# Patient Record
Sex: FEMALE | Race: WHITE | Employment: FULL TIME | URBAN - METROPOLITAN AREA
[De-identification: names, ages, dates, MRNs, and addresses within clinical notes are randomized per-mention and may not be internally consistent; named-entity substitution may affect disease eponyms.]

---

## 2018-10-24 ENCOUNTER — HOSPITAL ENCOUNTER (OUTPATIENT)
Facility: CLINIC | Age: 48
Setting detail: OBSERVATION
Discharge: HOME | End: 2018-10-26
Attending: EMERGENCY MEDICINE | Admitting: ORTHOPAEDIC SURGERY
Payer: OTHER MISCELLANEOUS

## 2018-10-24 ENCOUNTER — APPOINTMENT (OUTPATIENT)
Dept: GENERAL RADIOLOGY | Facility: CLINIC | Age: 48
End: 2018-10-24
Attending: EMERGENCY MEDICINE
Payer: OTHER MISCELLANEOUS

## 2018-10-24 DIAGNOSIS — S42.292A HUMERUS HEAD FRACTURE, LEFT, CLOSED, INITIAL ENCOUNTER: ICD-10-CM

## 2018-10-24 PROBLEM — S42.302A LEFT HUMERAL FRACTURE: Status: ACTIVE | Noted: 2018-10-24

## 2018-10-24 LAB
ANION GAP SERPL CALCULATED.3IONS-SCNC: 8 MMOL/L (ref 3–14)
BASOPHILS # BLD AUTO: 0.1 10E9/L (ref 0–0.2)
BASOPHILS NFR BLD AUTO: 0.5 %
BUN SERPL-MCNC: 17 MG/DL (ref 7–30)
CALCIUM SERPL-MCNC: 9.4 MG/DL (ref 8.5–10.1)
CHLORIDE SERPL-SCNC: 107 MMOL/L (ref 94–109)
CO2 SERPL-SCNC: 26 MMOL/L (ref 20–32)
CREAT SERPL-MCNC: 0.69 MG/DL (ref 0.52–1.04)
DIFFERENTIAL METHOD BLD: ABNORMAL
EOSINOPHIL # BLD AUTO: 0.2 10E9/L (ref 0–0.7)
EOSINOPHIL NFR BLD AUTO: 1.6 %
ERYTHROCYTE [DISTWIDTH] IN BLOOD BY AUTOMATED COUNT: 13.8 % (ref 10–15)
GFR SERPL CREATININE-BSD FRML MDRD: >90 ML/MIN/1.7M2
GLUCOSE BLDC GLUCOMTR-MCNC: 160 MG/DL (ref 70–99)
GLUCOSE SERPL-MCNC: 154 MG/DL (ref 70–99)
HBA1C MFR BLD: 7.3 % (ref 0–5.6)
HCT VFR BLD AUTO: 45 % (ref 35–47)
HGB BLD-MCNC: 15.2 G/DL (ref 11.7–15.7)
IMM GRANULOCYTES # BLD: 0.1 10E9/L (ref 0–0.4)
IMM GRANULOCYTES NFR BLD: 0.9 %
LYMPHOCYTES # BLD AUTO: 1.8 10E9/L (ref 0.8–5.3)
LYMPHOCYTES NFR BLD AUTO: 12.3 %
MCH RBC QN AUTO: 28.8 PG (ref 26.5–33)
MCHC RBC AUTO-ENTMCNC: 33.8 G/DL (ref 31.5–36.5)
MCV RBC AUTO: 85 FL (ref 78–100)
MONOCYTES # BLD AUTO: 0.8 10E9/L (ref 0–1.3)
MONOCYTES NFR BLD AUTO: 5.3 %
NEUTROPHILS # BLD AUTO: 11.8 10E9/L (ref 1.6–8.3)
NEUTROPHILS NFR BLD AUTO: 79.4 %
NRBC # BLD AUTO: 0 10*3/UL
NRBC BLD AUTO-RTO: 0 /100
PLATELET # BLD AUTO: 347 10E9/L (ref 150–450)
POTASSIUM SERPL-SCNC: 4.2 MMOL/L (ref 3.4–5.3)
RBC # BLD AUTO: 5.28 10E12/L (ref 3.8–5.2)
SODIUM SERPL-SCNC: 141 MMOL/L (ref 133–144)
WBC # BLD AUTO: 14.8 10E9/L (ref 4–11)

## 2018-10-24 PROCEDURE — 73060 X-RAY EXAM OF HUMERUS: CPT | Mod: LT

## 2018-10-24 PROCEDURE — 96372 THER/PROPH/DIAG INJ SC/IM: CPT | Mod: 59

## 2018-10-24 PROCEDURE — 25000128 H RX IP 250 OP 636: Performed by: PHYSICIAN ASSISTANT

## 2018-10-24 PROCEDURE — 99207 ZZC CONSULT E&M CHANGED TO SUBSEQUENT LEVEL: CPT | Performed by: INTERNAL MEDICINE

## 2018-10-24 PROCEDURE — 99225 ZZC SUBSEQUENT OBSERVATION CARE,LEVEL II: CPT | Performed by: INTERNAL MEDICINE

## 2018-10-24 PROCEDURE — 80048 BASIC METABOLIC PNL TOTAL CA: CPT | Performed by: EMERGENCY MEDICINE

## 2018-10-24 PROCEDURE — 00000146 ZZHCL STATISTIC GLUCOSE BY METER IP

## 2018-10-24 PROCEDURE — 25000132 ZZH RX MED GY IP 250 OP 250 PS 637: Performed by: HOSPITALIST

## 2018-10-24 PROCEDURE — 25000128 H RX IP 250 OP 636: Performed by: EMERGENCY MEDICINE

## 2018-10-24 PROCEDURE — 99285 EMERGENCY DEPT VISIT HI MDM: CPT | Mod: 25

## 2018-10-24 PROCEDURE — 25000132 ZZH RX MED GY IP 250 OP 250 PS 637: Performed by: EMERGENCY MEDICINE

## 2018-10-24 PROCEDURE — 85025 COMPLETE CBC W/AUTO DIFF WBC: CPT | Performed by: EMERGENCY MEDICINE

## 2018-10-24 PROCEDURE — 96374 THER/PROPH/DIAG INJ IV PUSH: CPT

## 2018-10-24 PROCEDURE — 25000128 H RX IP 250 OP 636

## 2018-10-24 PROCEDURE — 83036 HEMOGLOBIN GLYCOSYLATED A1C: CPT | Performed by: EMERGENCY MEDICINE

## 2018-10-24 PROCEDURE — 29105 APPLICATION LONG ARM SPLINT: CPT | Mod: LT

## 2018-10-24 PROCEDURE — 96376 TX/PRO/DX INJ SAME DRUG ADON: CPT

## 2018-10-24 PROCEDURE — 25000131 ZZH RX MED GY IP 250 OP 636 PS 637: Performed by: INTERNAL MEDICINE

## 2018-10-24 PROCEDURE — G0378 HOSPITAL OBSERVATION PER HR: HCPCS

## 2018-10-24 PROCEDURE — 96361 HYDRATE IV INFUSION ADD-ON: CPT

## 2018-10-24 RX ORDER — HYDROMORPHONE HYDROCHLORIDE 1 MG/ML
.3-.5 INJECTION, SOLUTION INTRAMUSCULAR; INTRAVENOUS; SUBCUTANEOUS
Status: DISCONTINUED | OUTPATIENT
Start: 2018-10-24 | End: 2018-10-25

## 2018-10-24 RX ORDER — ACETAMINOPHEN 325 MG/1
650 TABLET ORAL EVERY 4 HOURS PRN
Status: DISCONTINUED | OUTPATIENT
Start: 2018-10-24 | End: 2018-10-25

## 2018-10-24 RX ORDER — NALOXONE HYDROCHLORIDE 0.4 MG/ML
.1-.4 INJECTION, SOLUTION INTRAMUSCULAR; INTRAVENOUS; SUBCUTANEOUS
Status: DISCONTINUED | OUTPATIENT
Start: 2018-10-24 | End: 2018-10-25

## 2018-10-24 RX ORDER — OXYCODONE HYDROCHLORIDE 5 MG/1
5-10 TABLET ORAL
Status: DISCONTINUED | OUTPATIENT
Start: 2018-10-24 | End: 2018-10-24

## 2018-10-24 RX ORDER — INSULIN GLARGINE 100 [IU]/ML
80 INJECTION, SOLUTION SUBCUTANEOUS AT BEDTIME
COMMUNITY

## 2018-10-24 RX ORDER — HYDROMORPHONE HYDROCHLORIDE 1 MG/ML
INJECTION, SOLUTION INTRAMUSCULAR; INTRAVENOUS; SUBCUTANEOUS
Status: COMPLETED
Start: 2018-10-24 | End: 2018-10-24

## 2018-10-24 RX ORDER — CETIRIZINE HYDROCHLORIDE 10 MG/1
10 TABLET ORAL DAILY
COMMUNITY

## 2018-10-24 RX ORDER — DEXTROSE MONOHYDRATE 25 G/50ML
25-50 INJECTION, SOLUTION INTRAVENOUS
Status: DISCONTINUED | OUTPATIENT
Start: 2018-10-24 | End: 2018-10-26

## 2018-10-24 RX ORDER — ACETAMINOPHEN 650 MG/1
650 SUPPOSITORY RECTAL EVERY 4 HOURS PRN
Status: DISCONTINUED | OUTPATIENT
Start: 2018-10-24 | End: 2018-10-25

## 2018-10-24 RX ORDER — HYDROMORPHONE HYDROCHLORIDE 2 MG/1
2 TABLET ORAL
Status: DISCONTINUED | OUTPATIENT
Start: 2018-10-24 | End: 2018-10-25

## 2018-10-24 RX ORDER — NICOTINE POLACRILEX 4 MG
15-30 LOZENGE BUCCAL
Status: DISCONTINUED | OUTPATIENT
Start: 2018-10-24 | End: 2018-10-26

## 2018-10-24 RX ORDER — CYCLOBENZAPRINE HCL 5 MG
5 TABLET ORAL 3 TIMES DAILY PRN
Status: DISCONTINUED | OUTPATIENT
Start: 2018-10-24 | End: 2018-10-25

## 2018-10-24 RX ORDER — LEVOTHYROXINE SODIUM 175 UG/1
TABLET ORAL DAILY
COMMUNITY

## 2018-10-24 RX ORDER — LEVOTHYROXINE SODIUM 175 UG/1
350 TABLET ORAL DAILY
COMMUNITY
End: 2018-10-24

## 2018-10-24 RX ORDER — SODIUM CHLORIDE 9 MG/ML
1000 INJECTION, SOLUTION INTRAVENOUS CONTINUOUS
Status: DISCONTINUED | OUTPATIENT
Start: 2018-10-24 | End: 2018-10-24

## 2018-10-24 RX ORDER — ONDANSETRON 2 MG/ML
4 INJECTION INTRAMUSCULAR; INTRAVENOUS ONCE
Status: DISCONTINUED | OUTPATIENT
Start: 2018-10-24 | End: 2018-10-24

## 2018-10-24 RX ORDER — IBUPROFEN 600 MG/1
600 TABLET, FILM COATED ORAL ONCE
Status: COMPLETED | OUTPATIENT
Start: 2018-10-24 | End: 2018-10-24

## 2018-10-24 RX ADMIN — HYDROMORPHONE HYDROCHLORIDE 1 MG: 1 INJECTION, SOLUTION INTRAMUSCULAR; INTRAVENOUS; SUBCUTANEOUS at 16:03

## 2018-10-24 RX ADMIN — SODIUM CHLORIDE 1000 ML: 9 INJECTION, SOLUTION INTRAVENOUS at 16:17

## 2018-10-24 RX ADMIN — HYDROMORPHONE HYDROCHLORIDE 1 MG: 1 INJECTION, SOLUTION INTRAMUSCULAR; INTRAVENOUS; SUBCUTANEOUS at 21:06

## 2018-10-24 RX ADMIN — HYDROMORPHONE HYDROCHLORIDE 2 MG: 2 TABLET ORAL at 23:32

## 2018-10-24 RX ADMIN — INSULIN GLARGINE 60 UNITS: 100 INJECTION, SOLUTION SUBCUTANEOUS at 23:32

## 2018-10-24 RX ADMIN — HYDROMORPHONE HYDROCHLORIDE 1 MG: 1 INJECTION, SOLUTION INTRAMUSCULAR; INTRAVENOUS; SUBCUTANEOUS at 16:35

## 2018-10-24 RX ADMIN — Medication 1 MG: at 16:35

## 2018-10-24 RX ADMIN — IBUPROFEN 600 MG: 600 TABLET, FILM COATED ORAL at 14:26

## 2018-10-24 RX ADMIN — Medication 0.5 MG: at 18:51

## 2018-10-24 RX ADMIN — CYCLOBENZAPRINE HYDROCHLORIDE 5 MG: 5 TABLET, FILM COATED ORAL at 23:31

## 2018-10-24 ASSESSMENT — ENCOUNTER SYMPTOMS
WOUND: 1
HEADACHES: 0
NUMBNESS: 1

## 2018-10-24 NOTE — ED NOTES
"[facility]  ED Nurse Handoff Report    ED Chief complaint: Shoulder Pain (tripped on lip of floor and fell hitting L shoulder against door jam. )      ED Diagnosis:   Final diagnoses:   Humerus head fracture, left, closed, initial encounter       Code Status: Full Code    Allergies:   Allergies   Allergen Reactions     Contrast Dye        Activity level - Baseline/Home:  Independent    Activity Level - Current:   Stand with Assist     Needed?: No    Isolation: No  Infection: Not Applicable  Bariatric?: Yes    Vital Signs:   Vitals:    10/24/18 1410 10/24/18 1411 10/24/18 1415 10/24/18 1604   BP: (!) 161/107      Pulse: 93      Resp: 18      Temp:  98.2  F (36.8  C)     TempSrc:  Oral     SpO2: 95%  93% 93%   Weight: 138.3 kg (305 lb)      Height: 1.575 m (5' 2\")          Cardiac Rhythm: ,        Pain level: 0-10 Pain Scale: 10    Is this patient confused?: No   [place] - Suicide Severity Rating Scale Completed?  Yes  If yes, what color did the patient score?  White    Patient Report: Initial Complaint: Patient presents to ED via EMS from work after tripping on a small piece of don and jamming L shoulder into door jam. Patient has L humerus pain.   Focused Assessment: L humerus pain, some numbness to L hand.   Tests Performed: Xrays and pain control.   Abnormal Results: L humerus fracture  Treatments provided: Pain mgt.    Family Comments: Boss is here, patient is from out of town.     OBS brochure/video discussed/provided to patient/family: N/A              Name of person given brochure if not patient:               Relationship to patient:     ED Medications:   Medications   sodium chloride (PF) 0.9% PF flush 3 mL (not administered)   sodium chloride (PF) 0.9% PF flush 3 mL (not administered)   0.9% sodium chloride BOLUS (not administered)     Followed by   sodium chloride 0.9% infusion (not administered)   ondansetron (ZOFRAN) injection 4 mg (not administered)   ibuprofen " (ADVIL/MOTRIN) tablet 600 mg (600 mg Oral Given 10/24/18 8760)   HYDROmorphone (DILAUDID) injection 1 mg (1 mg Intravenous Given 10/24/18 9712)       Drips infusing?:  Yes    For the majority of the shift this patient was Green.   Interventions performed were none.    Severe Sepsis OR Septic Shock Diagnosis Present: No    To be done/followed up on inpatient unit:      ED NURSE PHONE NUMBER: [phone]

## 2018-10-24 NOTE — IP AVS SNAPSHOT
MRN:[id number]                      After Visit Summary   10/24/2018    Moon Mg    MRN: [id number]           Thank you!     Thank you for choosing [facility] for your care. Our goal is always to provide you with excellent care. Hearing back from our patients is one way we can continue to improve our services. Please take a few minutes to complete the written survey that you may receive in the mail after you visit with us. Thank you!        Patient Information     Date Of Birth          1970        Designated Caregiver       Most Recent Value    Caregiver    Will someone help with your care after discharge? yes    Name of designated caregiver Brother      About your hospital stay     You were admitted on:  October 24, 2018 You last received care in the:  [place] [address] [facility]    You were discharged on:  October 26, 2018        Reason for your hospital stay       Left humerus fracture with open reduction and internal fixation                  Who to Call     For medical emergencies, please call 911.  For non-urgent questions about your medical care, please call your primary care provider or clinic, None  For questions related to your surgery, please call your surgery clinic        Attending Provider     Provider Specialty    Trierweiler, Chad A, MD Emergency Medicine    Pietro Boone MD Orthopaedic Surgery       Primary Care Provider    Provider Not In System      After Care Instructions     Activity       Your activity upon discharge: activity as tolerated, no driving while on analgesics and no weight bearing through the left UE, may do codmans and pendulums when pain controlled            Diet       Follow this diet upon discharge: Orders Placed This Encounter      Moderate Consistent CHO Diet            Patient care order       Go up slowly on your lantus.   Check blood sugars 4 times daily for next 3-4 days then as you normally would if back to your baseline  "appetite and food intake.   If blood sugars in the 100 range today then stay at Lantus 45 units tonight, If blood sugars in 200 range than increase to 55 units at bedtime. If blood sugars in the 300 range increase to ~65 units tonight at bedtime. If blood sugars greater than 400 then increase back to 75-80 units of lantus tonight. You can use this scale for then next several days.  Stay hydrated with water to prevent dehydration if blood sugars are elevated.            Wound care and dressings       Instructions to care for your wound at home: ice to area for comfort, keep wound clean and dry and reinforce dressing as needed.  Leave dressing intact                  Follow-up Appointments     Follow-up and recommended labs and tests        Follow up with Shoulder specialist or fracture specialist in [place], within 10 days  to evaluate after surgery. No follow up labs or test are needed.                  Pending Results     Date and Time Order Name Status Description    10/24/2018 2205 EKG 12-lead, tracing only Preliminary             Admission Information     Date & Time Provider Department Dept. Phone    10/24/2018 Pietro Boone MD [place] [address] [facility] [phone]      Your Vitals Were     Blood Pressure Pulse Temperature Respirations Height Weight    127/74 (BP Location: Right arm) 72 98.7  F (37.1  C) (Oral) 16 1.575 m (5' 2\") 138.3 kg (305 lb)    Pulse Oximetry BMI (Body Mass Index)                92% 55.79 kg/m2          [employer] Information     The [employer] lets you send messages to your doctor, view your test results, renew your prescriptions, schedule appointments and more. To sign up, go to [email or web address] . Click on \"Log in\" on the left side of the screen, which will take you to the Welcome page. Then click on \"Sign up Now\" on the right side of the page.     You will be asked to enter the access code listed below, as well as some personal information. Please follow the " directions to create your username and password.     Your access code is: [email or web address]  Expires: 2019 11:44 AM     Your access code will  in 90 days. If you need help or a new code, please call your [facility] clinic or [phone].        Care EveryWhere ID     This is your Care EveryWhere ID. This could be used by other organizations to access your [facility] medical records  [id number]        Equal Access to Services     GUSTAVO SANTACRUZ : Hadii aad ku hadasho Soomaali, waaxda luqadaha, qaybta kaalmada adeegyada, waxay elyssain hayaan adeolivia bradleydeejaypedro pablo lack . So [facility] [phone].    ATENCIÓN: Si habla español, tiene a casillas disposición servicios gratuitos de asistencia lingüística. Llame al [phone].    We comply with applicable federal civil rights laws and [place] laws. We do not discriminate on the basis of race, color, national origin, age, disability, sex, sexual orientation, or gender identity.               Review of your medicines      START taking        Dose / Directions    acetaminophen 325 MG tablet   Commonly known as:  TYLENOL   Used for:  Humerus head fracture, left, closed, initial encounter        Dose:  650 mg   Start taking on:  10/28/2018   Take 2 tablets (650 mg) by mouth every 4 hours as needed for pain   Quantity:  40 tablet   Refills:  0       aspirin 325 MG EC tablet   Used for:  Humerus head fracture, left, closed, initial encounter        Dose:  325 mg   Take 1 tablet (325 mg) by mouth daily   Quantity:  30 tablet   Refills:  0       cyclobenzaprine 5 MG tablet   Commonly known as:  FLEXERIL   Used for:  Humerus head fracture, left, closed, initial encounter        Dose:  5 mg   Take 1 tablet (5 mg) by mouth 3 times daily as needed for muscle spasms   Quantity:  30 tablet   Refills:  0       HYDROmorphone 2 MG tablet   Commonly known as:  DILAUDID   Used for:  Humerus head fracture, left, closed, initial encounter        Dose:  2-4 mg   Take 1-2 tablets (2-4 mg) by mouth every 4  hours as needed for moderate to severe pain   Quantity:  30 tablet   Refills:  0       hydrOXYzine 25 MG tablet   Commonly known as:  ATARAX   Used for:  Humerus head fracture, left, closed, initial encounter        Dose:  25 mg   Take 1 tablet (25 mg) by mouth every 6 hours as needed for itching   Quantity:  30 tablet   Refills:  0       ondansetron 4 MG ODT tab   Commonly known as:  ZOFRAN-ODT   Used for:  Humerus head fracture, left, closed, initial encounter        Dose:  4 mg   Take 1 tablet (4 mg) by mouth every 6 hours as needed for nausea or vomiting   Quantity:  10 tablet   Refills:  0       senna-docusate 8.6-50 MG per tablet   Commonly known as:  SENOKOT-S;PERICOLACE   Used for:  Humerus head fracture, left, closed, initial encounter        Dose:  2 tablet   Take 2 tablets by mouth 2 times daily   Quantity:  40 tablet   Refills:  0         CONTINUE these medicines which have NOT CHANGED        Dose / Directions    cetirizine 10 MG tablet   Commonly known as:  zyrTEC        Dose:  10 mg   Take 10 mg by mouth daily   Refills:  0       insulin glargine 100 UNIT/ML injection   Commonly known as:  LANTUS        Dose:  80 Units   Inject 80 Units Subcutaneous At Bedtime   Refills:  0       insulin lispro 100 UNIT/ML injection   Commonly known as:  HumaLOG PEN        Inject Subcutaneous 3 times daily (before meals) Per sliding scale. Pt not sure about the dosing. Most of the times she does not use.   Refills:  0       levothyroxine 175 MCG tablet   Commonly known as:  SYNTHROID/LEVOTHROID        Take by mouth daily 1 tablet (175 mcg daily) except 2 tablets (350 mcg) on Wednesdays.   Refills:  0            Where to get your medicines      These medications were sent to [facility] - [address]  [address] [place] [address]     Phone:  [phone]     ondansetron 4 MG ODT tab         Some of these will need a paper prescription and others can be bought over the  counter. Ask your nurse if you have questions.     Bring a paper prescription for each of these medications     acetaminophen 325 MG tablet    aspirin 325 MG EC tablet    cyclobenzaprine 5 MG tablet    HYDROmorphone 2 MG tablet    hydrOXYzine 25 MG tablet    senna-docusate 8.6-50 MG per tablet                Protect others around you: Learn how to safely use, store and throw away your medicines at www.disposemymeds.org.        Information about OPIOIDS     PRESCRIPTION OPIOIDS: WHAT YOU NEED TO KNOW   We gave you an opioid (narcotic) pain medicine. It is important to manage your pain, but opioids are not always the best choice. You should first try all the other options your care team gave you. Take this medicine for as short a time (and as few doses) as possible.    Some activities can increase your pain, such as bandage changes or therapy sessions. It may help to take your pain medicine 30 to 60 minutes before these activities. Reduce your stress by getting enough sleep, working on hobbies you enjoy and practicing relaxation or meditation. Talk to your care team about ways to manage your pain beyond prescription opioids.    These medicines have risks:    DO NOT drive when on new or higher doses of pain medicine. These medicines can affect your alertness and reaction times, and you could be arrested for driving under the influence (DUI). If you need to use opioids long-term, talk to your care team about driving.    DO NOT operate heavy machinery    DO NOT do any other dangerous activities while taking these medicines.    DO NOT drink any alcohol while taking these medicines.     If the opioid prescribed includes acetaminophen, DO NOT take with any other medicines that contain acetaminophen. Read all labels carefully. Look for the word  acetaminophen  or  Tylenol.  Ask your pharmacist if you have questions or are unsure.    You can get addicted to pain medicines, especially if you have a history of addiction  (chemical, alcohol or substance dependence). Talk to your care team about ways to reduce this risk.    All opioids tend to cause constipation. Drink plenty of water and eat foods that have a lot of fiber, such as fruits, vegetables, prune juice, apple juice and high-fiber cereal. Take a laxative (Miralax, milk of magnesia, Colace, Senna) if you don t move your bowels at least every other day. Other side effects include upset stomach, sleepiness, dizziness, throwing up, tolerance (needing more of the medicine to have the same effect), physical dependence and slowed breathing.    Store your pills in a secure place, locked if possible. We will not replace any lost or stolen medicine. If you don t finish your medicine, please throw away (dispose) as directed by your pharmacist. The [place] Pollution [employer] has more information about safe disposal: https://[email or web address]/living-green/managing-unwanted-medications             Medication List: This is a list of all your medications and when to take them. Check marks below indicate your daily home schedule. Keep this list as a reference.      Medications           Morning Afternoon Evening Bedtime As Needed    acetaminophen 325 MG tablet   Commonly known as:  TYLENOL   Take 2 tablets (650 mg) by mouth every 4 hours as needed for pain   Start taking on:  10/28/2018   Last time this was given:  975 mg on 10/26/2018  9:37 AM   Last time this was given:  10/26 12:37                                   aspirin 325 MG EC tablet   Take 1 tablet (325 mg) by mouth daily   Last time this was given:  325 mg on 10/26/2018  9:39 AM   Next Dose Due:  10/27 am                                   cetirizine 10 MG tablet   Commonly known as:  zyrTEC   Take 10 mg by mouth daily   Next Dose Due:  Resume as directed                                cyclobenzaprine 5 MG tablet   Commonly known as:  FLEXERIL   Take 1 tablet (5 mg) by mouth 3 times daily as needed for muscle spasms    Last time this was given:  5 mg on 10/25/2018  8:25 PM   Next Dose Due:  Anytime                                HYDROmorphone 2 MG tablet   Commonly known as:  DILAUDID   Take 1-2 tablets (2-4 mg) by mouth every 4 hours as needed for moderate to severe pain   Last time this was given:  2 mg on 10/26/2018  9:41 AM   Next Dose Due:  10/26 1:41                                   hydrOXYzine 25 MG tablet   Commonly known as:  ATARAX   Take 1 tablet (25 mg) by mouth every 6 hours as needed for itching   Next Dose Due:  Anytime **Be careful may make you drwosy                                insulin glargine 100 UNIT/ML injection   Commonly known as:  LANTUS   Inject 80 Units Subcutaneous At Bedtime   Last time this was given:  45 Units on 10/25/2018 10:55 PM   Next Dose Due:  10/26 pm                                   insulin lispro 100 UNIT/ML injection   Commonly known as:  HumaLOG PEN   Inject Subcutaneous 3 times daily (before meals) Per sliding scale. Pt not sure about the dosing. Most of the times she does not use.   Next Dose Due:  Resume as directed                                levothyroxine 175 MCG tablet   Commonly known as:  SYNTHROID/LEVOTHROID   Take by mouth daily 1 tablet (175 mcg daily) except 2 tablets (350 mcg) on Wednesdays.   Last time this was given:  175 mcg on 10/26/2018  9:38 AM                                ondansetron 4 MG ODT tab   Commonly known as:  ZOFRAN-ODT   Take 1 tablet (4 mg) by mouth every 6 hours as needed for nausea or vomiting   Next Dose Due:  Take when nauseated                                senna-docusate 8.6-50 MG per tablet   Commonly known as:  SENOKOT-S;PERICOLACE   Take 2 tablets by mouth 2 times daily   Last time this was given:  1 tablet on 10/26/2018  9:39 AM   Next Dose Due:  10/26 pm

## 2018-10-24 NOTE — CONSULTS
[facility]    Hospitalist Consultation    Date of Admission:  10/24/2018    Assessment & Plan   Moon Mg is a 48 year old female with a history of DM type II and hypothyroidism who was admitted on 10/24/2018 for a left humerus fracture.  I was asked to see the patient for medical co-management and pre-op evaluation     Left humerus fracture  Fell against a door frame fracturing her left humerus.  Imaging showed an oblique displaced fracture through the shaft of the humerus.  Patient was admitted by orthopedic surgery for further pain control and possible surgical intervention   - Will defer pain control and briseyda-operative management to orthopedic surgery if they decide to operate   - I believe the patient is moderate risk for a moderate risk surgery if they decide to operate.  Patient is morbidly obese so it is difficulty to assess her metabolic activity but assume it is >4.  She does get short of breath with significant exertion but no chest pain.  Will obtain EKG in mean time     DM type II   Patient isn't too forth coming with what her home sugars have been recently but does note they were high a couple weeks ago because she was on Prednisone but is no longer on that.  No HgbA1c on file.  PTA on Lantus 80 U at bedtime and SSI   - HgbA1c ordered  - Will decreased Lantus to 60 U and adjust as needed   - Medium intensity SSI  - Moderate CHO diet     Hypothyroidism   No TSH on file  - PTA Synthroid 175 mcg     Morbid obesity  - Patient to follow up with PCP     DVT Prophylaxis: Defer to primary service  Code Status: Full code    Disposition: Expected discharge per primary service     Luke Zuluaga DO    Reason for Consult   Reason for consult: pre-op evaluation and medical co-management     Primary Care Physician   Provider Not In System- lives in [place]     Chief Complaint   Left shoulder pain     History is obtained from the patient    History of Present Illness   Moon Mg  is a 48 year old female with a history of DM type II and hypothyroidism who presented to the ED with left shoulder pain after a fall.  She reported that she tripped on a raised floor and fell forward striking her left shoulder against the doorframe.  She immediately had pain to the left shoulder.  She did not hit her head or lose consciousness.  She denies any recent illnesses, fevers, chills, cough, chest pain, SOB, abdominal pain, N/V/D or problems with urination.      Of note, the patient was initially scheduled to fly home to [place] today.     Past Medical History    DM type II   Hypothyroidism    Past Surgical History   I have reviewed this patient's surgical history and updated it with pertinent information if needed.  Past Surgical History:   Procedure Laterality Date     BACK SURGERY       GYN SURGERY         Prior to Admission Medications   Prior to Admission Medications   Prescriptions Last Dose Informant Patient Reported? Taking?   insulin glargine (LANTUS) 100 UNIT/ML injection 10/23/2018 at Unknown time Self Yes Yes   Sig: Inject 80 Units Subcutaneous At Bedtime    insulin lispro (HUMALOG PEN) 100 UNIT/ML injection  Self Yes Yes   Sig: Inject Subcutaneous 3 times daily (before meals) Per sliding scale. Pt not sure about the dosing.   levothyroxine (SYNTHROID/LEVOTHROID) 175 MCG tablet 10/24/2018 at Unknown time Self Yes Yes   Sig: Take 175 mcg by mouth daily 1 tablet (175 mcg daily) except 2 tablets (350 mcg) on Wednesdays.      Facility-Administered Medications: None     Allergies   Allergies   Allergen Reactions     Contrast Dye        Social History   I have reviewed this patient's social history and updated it with pertinent information if needed. Moon Mg  reports that she has never smoked. She does not have any smokeless tobacco history on file. She reports that she drinks alcohol. She reports that she does not use illicit drugs.    Family History   I have reviewed this patient's family  history and updated it with pertinent information if needed.   Diabetes    Review of Systems   The 10 point Review of Systems is negative other than noted in the HPI     Physical Exam   Temp: 98.2  F (36.8  C) Temp src: Oral BP: (!) 161/107 Pulse: 93   Resp: 18 SpO2: 93 % O2 Device: None (Room air)    Vital Signs with Ranges  Temp:  [98.2  F (36.8  C)] 98.2  F (36.8  C)  Pulse:  [93] 93  Resp:  [18] 18  BP: (161)/(107) 161/107  SpO2:  [93 %-95 %] 93 %  305 lbs 0 oz    onstitutional: Awake, alert, cooperative, no apparent distress.  Eyes: Conjunctiva and pupils examined and normal.  HEENT: Moist mucous membranes, normal dentition.  Respiratory: Clear to auscultation bilaterally, no crackles or wheezing.  Cardiovascular: Regular rate and rhythm, normal S1 and S2, and no murmur noted.  GI: Soft, non-distended, non-tender, normal bowel sounds.  Lymph/Hematologic: No anterior cervical or supraclavicular adenopathy.  Skin: No rashes, no cyanosis, no edema.  Musculoskeletal: Left arm is in a sling.   Still able to move left fingers   Neurologic: Cranial nerves 2-12 intact, normal strength and sensation.  Psychiatric: Alert, oriented to person, place and time, no obvious anxiety or depression.    Data   -Data reviewed today: All pertinent laboratory and imaging results from this encounter were reviewed. I personally reviewed  X-ray left humerus:  Fracture through the shaft of the humerus      Recent Labs  Lab 10/24/18  1613   WBC 14.8*   HGB 15.2   MCV 85         POTASSIUM 4.2   CHLORIDE 107   CO2 26   BUN 17   CR 0.69   ANIONGAP 8   ANDREINA 9.4   *       Imaging:  Recent Results (from the past 24 hour(s))   XR Humerus Left G/E 2 Views    Narrative    HUMERUS LEFT TWO OR MORE VIEWS October 24, 2018 2:56 PM     HISTORY: Pain after trauma.    COMPARISON: None.      Impression    IMPRESSION: There is an oblique displaced fracture through the shaft  of the humerus between the proximal one third and the distal  two  thirds. The distal fracture fragment is displaced at least 2 cm  laterally and there is shortening of the humerus. Glenohumeral  articulation maintained.    CORNELIUS MASCORRO MD

## 2018-10-24 NOTE — ED NOTES
Bed: ED12  Expected date:   Expected time:   Means of arrival:   Comments:  vangie - 48F fall shoulder pain eta 1400

## 2018-10-24 NOTE — CONSULTS
[facility] Orthopedic Consultation    Moon Mg MRN# [id number]   Age: 48 year old YOB: 1970     Date of Admission:  10/24/2018    Reason for consult: Left proximal humerus fracture       Requesting physician: Dr. Treiweiler       Level of consult: Consult, follow and place orders           Assessment and Plan:   Assessment:   Left proximal humerus fracture      Plan:   Admit to observation for pain control  Sling  Pain medication as needed  Non-WB through left UE  Non-operative treatment at this time, will discuss again in the AM  NPO after midnight            Chief Complaint:   Left proximal humerus fracture         History of Present Illness:   This patient is a 48 year old female who presents with the following condition requiring a hospital admission:    Patient currently visiting from [place] for work. She was walking through a door and tripped on a raised part of the floor, resulting in her hitting her left shoulder on the door jam. She had immediate pain and presented to the ED for further workup and treatment planning.          Past Medical History:     Past Medical History:   Diagnosis Date     Diabetes (H)      Thyroid disease              Past Surgical History:     Past Surgical History:   Procedure Laterality Date     BACK SURGERY       GYN SURGERY               Social History:     Social History   Substance Use Topics     Smoking status: Never Smoker     Smokeless tobacco: Not on file     Alcohol use Yes      Comment: socially             Family History:   No family history on file.          Immunizations:     VACCINE/DOSE   Diptheria   DPT   DTAP   HBIG   Hepatitis A   Hepatitis B   HIB   Influenza   Measles   Meningococcal   MMR   Mumps   Pneumococcal   Polio   Rubella   Small Pox   TDAP   Varicella   Zoster             Allergies:     Allergies   Allergen Reactions     Contrast Dye              Medications:     Current Facility-Administered Medications   Medication  "    0.9% sodium chloride BOLUS    Followed by     sodium chloride 0.9% infusion     ondansetron (ZOFRAN) injection 4 mg     sodium chloride (PF) 0.9% PF flush 3 mL     sodium chloride (PF) 0.9% PF flush 3 mL     Current Outpatient Prescriptions   Medication Sig     insulin glargine (LANTUS) 100 UNIT/ML injection Inject Subcutaneous At Bedtime     Levothyroxine Sodium (SYNTHROID PO)              Review of Systems:   CV: NEGATIVE for chest pain, palpitations or peripheral edema  C: NEGATIVE for fever, chills, change in weight  E/M: NEGATIVE for ear, mouth and throat problems  R: NEGATIVE for significant cough or SOB          Physical Exam:   All vitals have been reviewed  Patient Vitals for the past 24 hrs:   BP Temp Temp src Pulse Resp SpO2 Height Weight   10/24/18 1604 - - - - - 93 % - -   10/24/18 1415 - - - - - 93 % - -   10/24/18 1411 - 98.2  F (36.8  C) Oral - - - - -   10/24/18 1410 (!) 161/107 - - 93 18 95 % 1.575 m (5' 2\") 138.3 kg (305 lb)     No intake or output data in the 24 hours ending 10/24/18 1654    Splint in place   Sensation intact in upper arm over biceps as well as in hand r/m/u nerve distribution  Able to abduct and oppose left thumb  +Radial pulse  +cap refill          Data:   All laboratory data reviewed  Results for orders placed or performed during the hospital encounter of 10/24/18   XR Humerus Left G/E 2 Views    Narrative    HUMERUS LEFT TWO OR MORE VIEWS October 24, 2018 2:56 PM     HISTORY: Pain after trauma.    COMPARISON: None.      Impression    IMPRESSION: There is an oblique displaced fracture through the shaft  of the humerus between the proximal one third and the distal two  thirds. The distal fracture fragment is displaced at least 2 cm  laterally and there is shortening of the humerus. Glenohumeral  articulation maintained.    CORNELIUS MASCORRO MD   CBC with platelets differential   Result Value Ref Range    WBC 14.8 (H) 4.0 - 11.0 10e9/L    RBC Count 5.28 (H) 3.8 - 5.2 10e12/L "    Hemoglobin 15.2 11.7 - 15.7 g/dL    Hematocrit 45.0 35.0 - 47.0 %    MCV 85 78 - 100 fl    MCH 28.8 26.5 - 33.0 pg    MCHC 33.8 31.5 - 36.5 g/dL    RDW 13.8 10.0 - 15.0 %    Platelet Count 347 150 - 450 10e9/L    Diff Method Automated Method     % Neutrophils 79.4 %    % Lymphocytes 12.3 %    % Monocytes 5.3 %    % Eosinophils 1.6 %    % Basophils 0.5 %    % Immature Granulocytes 0.9 %    Nucleated RBCs 0 0 /100    Absolute Neutrophil 11.8 (H) 1.6 - 8.3 10e9/L    Absolute Lymphocytes 1.8 0.8 - 5.3 10e9/L    Absolute Monocytes 0.8 0.0 - 1.3 10e9/L    Absolute Eosinophils 0.2 0.0 - 0.7 10e9/L    Absolute Basophils 0.1 0.0 - 0.2 10e9/L    Abs Immature Granulocytes 0.1 0 - 0.4 10e9/L    Absolute Nucleated RBC 0.0    Basic metabolic panel   Result Value Ref Range    Sodium 141 133 - 144 mmol/L    Potassium 4.2 3.4 - 5.3 mmol/L    Chloride 107 94 - 109 mmol/L    Carbon Dioxide 26 20 - 32 mmol/L    Anion Gap 8 3 - 14 mmol/L    Glucose 154 (H) 70 - 99 mg/dL    Urea Nitrogen 17 7 - 30 mg/dL    Creatinine 0.69 0.52 - 1.04 mg/dL    GFR Estimate >90 >60 mL/min/1.7m2    GFR Estimate If Black >90 >60 mL/min/1.7m2    Calcium 9.4 8.5 - 10.1 mg/dL          Attestation:  I have reviewed today's vital signs, notes, medications, labs and imaging with Dr. Boone.  Amount of time performed on this consult: 20 minutes.    Shanti Moran PA-C

## 2018-10-24 NOTE — ED PROVIDER NOTES
"  History     Chief Complaint:  Shoulder Pain    HPI   Moon Mg is a 48 year old female who presents to the ED via EMS for evaluation of shoulder pain. This morning, she reports tripping on a raised part of the floor which caused her to fall and hit her left shoulder against the doorjamb on the way down. With worry about a possible dislocation, she decided to present to the ED. Here, she denies hitting anything else, other than a small abrasion on her left knee. She reports feeling numbness in her left hand and fingers. Of note, she is scheduled for a flight home to [place] today.     Allergies:  Contrast Dye     Medications:    Levothyroxine    Past Medical History:    Diabetes  Hypothyroidism    Past Surgical History:    Back Surgery  GYN surgery    Family History:    No past pertinent family history.    Social History:  Presents with boss.   From [place]; Here for work.   Has flight home today.     Negative for tobacco use.  Social alcohol use.      Review of Systems   Skin: Positive for wound.   Neurological: Positive for numbness. Negative for headaches.   All other systems reviewed and are negative.    Physical Exam     Patient Vitals for the past 24 hrs:   BP Temp Temp src Pulse Resp SpO2 Height Weight   10/24/18 1604 - - - - - 93 % - -   10/24/18 1415 - - - - - 93 % - -   10/24/18 1411 - 98.2  F (36.8  C) Oral - - - - -   10/24/18 1410 (!) 161/107 - - 93 18 95 % 1.575 m (5' 2\") 138.3 kg (305 lb)     Physical Exam  Eye:  Pupils are equal, round, and reactive.  Extraocular movements intact.    ENT:  No rhinorrhea.  Moist mucus membranes.  Normal tongue and tonsil.    Cardiac:  Regular rate and rhythm.  No murmurs, gallops, or rubs.    Pulmonary:  Clear to auscultation bilaterally.  No wheezes, rales, or rhonchi.    Abdomen:  Positive bowel sounds.  Abdomen is soft and non-distended, without focal tenderness.    MSK:  Normal movement through the elbow, wrist, and fingers without tendonous deficit. " There is shortening and focal tenderness at the mid-humerus.     SKIN:  Warm and dry with strong radial pulse and normal capillary refill.    NEURO:  5/5 strength through the fingers/wrist/elbow.  Normal sensation through the radial/ulnar/median nerve distributions.    PSYCH:  Normal affect    Emergency Department Course   Imaging:  Radiographic findings were communicated with the patient who voiced understanding of the findings.    XR Humerus 3 views, Left:   There is an oblique displaced fracture through the shaft  of the humerus between the proximal one third and the distal two  thirds. The distal fracture fragment is displaced at least 2 cm  laterally and there is shortening of the humerus. Glenohumeral  articulation maintained, as per radiology.     Procedures:    Narrative:        Coaptation plaster splint was applied and after placement I checked and adjusted the fit   to ensure proper positioning. Patient was more comfortable with splint in place.   Sensation and circulation are intact after splint placement.    Interventions:  1426 Ibuprofen, 600 mg, Oral  1617 NS 1L IV  1635 Dilaudid, 1 mg, IV    Please see MAR for full list of medications administered in the ED.    Emergency Department Course:  Nursing notes and vitals reviewed. (1501) I performed an exam of the patient as documented above.      IV inserted. Medicine administered as documented above. Blood drawn. This was sent to the lab for further testing, results above.     The patient was sent for a humerus x-ray while in the emergency department, findings above.      (1442) I consulted with radiology regarding the patient's x-ray. It was changed from a view of the shoulder to the humerus.      (1520) I reevaluated the patient and provided an update in regards to her ED course.  She is in agreement to be admitted and schedule a surgery for tomorrow.     (1532) I consulted with Dr. Zuluaga, hospitalist services, regarding the patient's history and  presentation here in the emergency department and for possible admission. They are not comfortable admitting her, and would like me to consult with Dr. Boone in orthopedics.     (1553) I consulted with Shanti Moran PA-C, regarding the patient's presentation here in the emergency department. She will discuss with Dr. Boone, and they will both come down to evaluate the patient.     (1623) Dr. Zuluaga contacted me looking for an update on the patient's presentation. I informed them that Dr. Boone has not evaluated her yet, so no admission decisions have been made.     (1651) I consulted with Dr. Boone, orthopedics, and Shanti Moran, regarding the patient's history and presentation here in the emergency department. He will evaluate the patient, and believes she could avoid surgery by being placed in a splint.  They placed a coaptation splint made out of plaster to her left arm.     (1710) I consulted with Shanti Moran after their joint evaluation. They are in agreement to accept the patient for admission.    Findings and plan explained to the Patient who consents to admission. Dr. Boone will admit the patient to a observation bed for further monitoring, evaluation, and treatment.     I personally reviewed the laboratory results with the Patient and answered all related questions prior to admission.      Impression & Plan      Medical Decision Making:  This unfortunate 48-year-old who lives in [place] presents to us after suffering a mechanical fall where she tripped over uneven don and fell into a door frame, striking her left upper arm and suffering a midshaft humerus fracture.  The patient denies there being any other significant injury that occurred.  She has a shortening of the left upper arm and x-ray clearly shows evidence of a fracture.  Initially, the patient was eager to be discharged to return to an airport and return to home.  However, despite having splint placement, the patient is in too  much pain to be able to be discharged.  I spoke with our orthopedic team who evaluated the patient in the ER and were on board with her being admitted to observation for pain control.  The determination as to whether she will require surgery has not yet been decided, though this will be further addressed tomorrow.  I see no other evidence of trauma and otherwise her vital signs are reassuring.  I do not feel she requires further workup at this juncture.  I did speak with the hospitalist service who will consult as a preoperative evaluation if they elect to undergo surgery tomorrow.    Diagnosis:    ICD-10-CM    1. Humerus head fracture, left, closed, initial encounter S42.292A CBC with platelets differential     Basic metabolic panel       Disposition:  Admitted to observation under the care of Dr. Boone, orthopedics    Scribe Disclosure:  I, Guillermina Smith, am serving as a scribe on 10/24/2018 at 3:01 PM to personally document services performed by Trierweiler, Chad A, MD based on my observations and the provider's statements to me.       Guillermina Smith  10/24/2018    EMERGENCY DEPARTMENT       Trierweiler, Chad A, MD  10/26/18 0047

## 2018-10-25 ENCOUNTER — APPOINTMENT (OUTPATIENT)
Dept: GENERAL RADIOLOGY | Facility: CLINIC | Age: 48
End: 2018-10-25
Attending: ORTHOPAEDIC SURGERY
Payer: OTHER MISCELLANEOUS

## 2018-10-25 ENCOUNTER — ANESTHESIA EVENT (OUTPATIENT)
Dept: SURGERY | Facility: CLINIC | Age: 48
End: 2018-10-25
Payer: OTHER MISCELLANEOUS

## 2018-10-25 ENCOUNTER — ANESTHESIA (OUTPATIENT)
Dept: SURGERY | Facility: CLINIC | Age: 48
End: 2018-10-25
Payer: OTHER MISCELLANEOUS

## 2018-10-25 LAB
GLUCOSE BLDC GLUCOMTR-MCNC: 101 MG/DL (ref 70–99)
GLUCOSE BLDC GLUCOMTR-MCNC: 101 MG/DL (ref 70–99)
GLUCOSE BLDC GLUCOMTR-MCNC: 137 MG/DL (ref 70–99)
GLUCOSE BLDC GLUCOMTR-MCNC: 149 MG/DL (ref 70–99)
GLUCOSE BLDC GLUCOMTR-MCNC: 158 MG/DL (ref 70–99)
GLUCOSE BLDC GLUCOMTR-MCNC: 174 MG/DL (ref 70–99)
GLUCOSE BLDC GLUCOMTR-MCNC: 76 MG/DL (ref 70–99)
GLUCOSE BLDC GLUCOMTR-MCNC: 91 MG/DL (ref 70–99)
GLUCOSE BLDC GLUCOMTR-MCNC: 95 MG/DL (ref 70–99)

## 2018-10-25 PROCEDURE — 00000146 ZZHCL STATISTIC GLUCOSE BY METER IP

## 2018-10-25 PROCEDURE — 25000132 ZZH RX MED GY IP 250 OP 250 PS 637: Performed by: HOSPITALIST

## 2018-10-25 PROCEDURE — 40000065 ZZH STATISTIC EKG NON-CHARGEABLE

## 2018-10-25 PROCEDURE — 37000009 ZZH ANESTHESIA TECHNICAL FEE, EACH ADDTL 15 MIN: Performed by: ORTHOPAEDIC SURGERY

## 2018-10-25 PROCEDURE — 25800025 ZZH RX 258: Performed by: NURSE ANESTHETIST, CERTIFIED REGISTERED

## 2018-10-25 PROCEDURE — 25000128 H RX IP 250 OP 636: Performed by: PHYSICIAN ASSISTANT

## 2018-10-25 PROCEDURE — 25800025 ZZH RX 258: Performed by: INTERNAL MEDICINE

## 2018-10-25 PROCEDURE — G0378 HOSPITAL OBSERVATION PER HR: HCPCS

## 2018-10-25 PROCEDURE — 25000128 H RX IP 250 OP 636: Performed by: ANESTHESIOLOGY

## 2018-10-25 PROCEDURE — 71000012 ZZH RECOVERY PHASE 1 LEVEL 1 FIRST HR: Performed by: ORTHOPAEDIC SURGERY

## 2018-10-25 PROCEDURE — 93005 ELECTROCARDIOGRAM TRACING: CPT

## 2018-10-25 PROCEDURE — 40000169 ZZH STATISTIC PRE-PROCEDURE ASSESSMENT I: Performed by: ORTHOPAEDIC SURGERY

## 2018-10-25 PROCEDURE — 96372 THER/PROPH/DIAG INJ SC/IM: CPT

## 2018-10-25 PROCEDURE — 25000125 ZZHC RX 250: Performed by: NURSE ANESTHETIST, CERTIFIED REGISTERED

## 2018-10-25 PROCEDURE — 25000128 H RX IP 250 OP 636: Performed by: NURSE ANESTHETIST, CERTIFIED REGISTERED

## 2018-10-25 PROCEDURE — 99207 ZZC CDG-CODE CATEGORY CHANGED: CPT | Performed by: INTERNAL MEDICINE

## 2018-10-25 PROCEDURE — 25000132 ZZH RX MED GY IP 250 OP 250 PS 637: Performed by: ORTHOPAEDIC SURGERY

## 2018-10-25 PROCEDURE — 27210794 ZZH OR GENERAL SUPPLY STERILE: Performed by: ORTHOPAEDIC SURGERY

## 2018-10-25 PROCEDURE — 27210995 ZZH RX 272: Performed by: ORTHOPAEDIC SURGERY

## 2018-10-25 PROCEDURE — 36000093 ZZH SURGERY LEVEL 4 1ST 30 MIN: Performed by: ORTHOPAEDIC SURGERY

## 2018-10-25 PROCEDURE — 40000277 XR SURGERY CARM FLUORO LESS THAN 5 MIN W STILLS

## 2018-10-25 PROCEDURE — 25000128 H RX IP 250 OP 636: Performed by: ORTHOPAEDIC SURGERY

## 2018-10-25 PROCEDURE — 25000125 ZZHC RX 250: Performed by: ORTHOPAEDIC SURGERY

## 2018-10-25 PROCEDURE — 93010 ELECTROCARDIOGRAM REPORT: CPT | Performed by: INTERNAL MEDICINE

## 2018-10-25 PROCEDURE — 99225 ZZC SUBSEQUENT OBSERVATION CARE,LEVEL II: CPT | Performed by: INTERNAL MEDICINE

## 2018-10-25 PROCEDURE — 96376 TX/PRO/DX INJ SAME DRUG ADON: CPT

## 2018-10-25 PROCEDURE — 37000008 ZZH ANESTHESIA TECHNICAL FEE, 1ST 30 MIN: Performed by: ORTHOPAEDIC SURGERY

## 2018-10-25 PROCEDURE — 25000131 ZZH RX MED GY IP 250 OP 636 PS 637: Performed by: INTERNAL MEDICINE

## 2018-10-25 PROCEDURE — 36000063 ZZH SURGERY LEVEL 4 EA 15 ADDTL MIN: Performed by: ORTHOPAEDIC SURGERY

## 2018-10-25 PROCEDURE — C1713 ANCHOR/SCREW BN/BN,TIS/BN: HCPCS | Performed by: ORTHOPAEDIC SURGERY

## 2018-10-25 PROCEDURE — 25000132 ZZH RX MED GY IP 250 OP 250 PS 637: Performed by: PHYSICIAN ASSISTANT

## 2018-10-25 PROCEDURE — 25000566 ZZH SEVOFLURANE, EA 15 MIN: Performed by: ORTHOPAEDIC SURGERY

## 2018-10-25 DEVICE — IMP SCR SYN CORTEX 4.5X28MM SELF TAP SS 214.828: Type: IMPLANTABLE DEVICE | Site: HUMERUS | Status: FUNCTIONAL

## 2018-10-25 DEVICE — IMP PLATE SYN LCP NARROW 4.5X152MM 08H SS 224.581: Type: IMPLANTABLE DEVICE | Site: HUMERUS | Status: FUNCTIONAL

## 2018-10-25 DEVICE — IMP SCR SYN CORTEX 3.5X24MM SELF TAP SS 204.824: Type: IMPLANTABLE DEVICE | Site: HUMERUS | Status: FUNCTIONAL

## 2018-10-25 DEVICE — IMP SCR SYN CORTEX 4.5X30MM SELF TAP SS 214.830: Type: IMPLANTABLE DEVICE | Site: HUMERUS | Status: FUNCTIONAL

## 2018-10-25 DEVICE — IMP SCR SYN CORTEX 4.5X38MM SELF TAP SS 214.838: Type: IMPLANTABLE DEVICE | Site: HUMERUS | Status: FUNCTIONAL

## 2018-10-25 RX ORDER — METHOCARBAMOL 750 MG/1
750 TABLET, FILM COATED ORAL 4 TIMES DAILY PRN
Status: DISCONTINUED | OUTPATIENT
Start: 2018-10-25 | End: 2018-10-25

## 2018-10-25 RX ORDER — DEXAMETHASONE SODIUM PHOSPHATE 4 MG/ML
INJECTION, SOLUTION INTRA-ARTICULAR; INTRALESIONAL; INTRAMUSCULAR; INTRAVENOUS; SOFT TISSUE PRN
Status: DISCONTINUED | OUTPATIENT
Start: 2018-10-25 | End: 2018-10-25

## 2018-10-25 RX ORDER — HYDROMORPHONE HYDROCHLORIDE 2 MG/1
2-4 TABLET ORAL
Status: DISCONTINUED | OUTPATIENT
Start: 2018-10-25 | End: 2018-10-26 | Stop reason: HOSPADM

## 2018-10-25 RX ORDER — HYDROMORPHONE HYDROCHLORIDE 1 MG/ML
.3-.5 INJECTION, SOLUTION INTRAMUSCULAR; INTRAVENOUS; SUBCUTANEOUS EVERY 5 MIN PRN
Status: DISCONTINUED | OUTPATIENT
Start: 2018-10-25 | End: 2018-10-25 | Stop reason: HOSPADM

## 2018-10-25 RX ORDER — CEFAZOLIN SODIUM 2 G/100ML
2 INJECTION, SOLUTION INTRAVENOUS EVERY 8 HOURS
Status: COMPLETED | OUTPATIENT
Start: 2018-10-25 | End: 2018-10-26

## 2018-10-25 RX ORDER — AMOXICILLIN 250 MG
2 CAPSULE ORAL 2 TIMES DAILY
Status: DISCONTINUED | OUTPATIENT
Start: 2018-10-25 | End: 2018-10-26 | Stop reason: HOSPADM

## 2018-10-25 RX ORDER — KETOROLAC TROMETHAMINE 30 MG/ML
30 INJECTION, SOLUTION INTRAMUSCULAR; INTRAVENOUS EVERY 6 HOURS
Status: DISCONTINUED | OUTPATIENT
Start: 2018-10-25 | End: 2018-10-26 | Stop reason: HOSPADM

## 2018-10-25 RX ORDER — ACETAMINOPHEN 325 MG/1
650 TABLET ORAL EVERY 4 HOURS PRN
Status: DISCONTINUED | OUTPATIENT
Start: 2018-10-28 | End: 2018-10-26 | Stop reason: HOSPADM

## 2018-10-25 RX ORDER — HYDROXYZINE HYDROCHLORIDE 25 MG/1
25 TABLET, FILM COATED ORAL EVERY 6 HOURS PRN
Status: DISCONTINUED | OUTPATIENT
Start: 2018-10-25 | End: 2018-10-26 | Stop reason: HOSPADM

## 2018-10-25 RX ORDER — FENTANYL CITRATE 50 UG/ML
INJECTION, SOLUTION INTRAMUSCULAR; INTRAVENOUS PRN
Status: DISCONTINUED | OUTPATIENT
Start: 2018-10-25 | End: 2018-10-25

## 2018-10-25 RX ORDER — DEXTROSE MONOHYDRATE 25 G/50ML
INJECTION, SOLUTION INTRAVENOUS PRN
Status: DISCONTINUED | OUTPATIENT
Start: 2018-10-25 | End: 2018-10-25

## 2018-10-25 RX ORDER — ONDANSETRON 2 MG/ML
4 INJECTION INTRAMUSCULAR; INTRAVENOUS EVERY 6 HOURS PRN
Status: DISCONTINUED | OUTPATIENT
Start: 2018-10-25 | End: 2018-10-26 | Stop reason: HOSPADM

## 2018-10-25 RX ORDER — CEFAZOLIN SODIUM 1 G/3ML
1 INJECTION, POWDER, FOR SOLUTION INTRAMUSCULAR; INTRAVENOUS SEE ADMIN INSTRUCTIONS
Status: DISCONTINUED | OUTPATIENT
Start: 2018-10-25 | End: 2018-10-25 | Stop reason: HOSPADM

## 2018-10-25 RX ORDER — NALOXONE HYDROCHLORIDE 0.4 MG/ML
.1-.4 INJECTION, SOLUTION INTRAMUSCULAR; INTRAVENOUS; SUBCUTANEOUS
Status: DISCONTINUED | OUTPATIENT
Start: 2018-10-25 | End: 2018-10-25

## 2018-10-25 RX ORDER — NICOTINE POLACRILEX 4 MG
15-30 LOZENGE BUCCAL
Status: DISCONTINUED | OUTPATIENT
Start: 2018-10-25 | End: 2018-10-25

## 2018-10-25 RX ORDER — DEXTROSE MONOHYDRATE, SODIUM CHLORIDE, AND POTASSIUM CHLORIDE 50; 1.49; 4.5 G/1000ML; G/1000ML; G/1000ML
INJECTION, SOLUTION INTRAVENOUS CONTINUOUS
Status: DISCONTINUED | OUTPATIENT
Start: 2018-10-25 | End: 2018-10-25

## 2018-10-25 RX ORDER — DEXTROSE MONOHYDRATE 25 G/50ML
25-50 INJECTION, SOLUTION INTRAVENOUS
Status: DISCONTINUED | OUTPATIENT
Start: 2018-10-25 | End: 2018-10-25

## 2018-10-25 RX ORDER — ONDANSETRON 2 MG/ML
INJECTION INTRAMUSCULAR; INTRAVENOUS PRN
Status: DISCONTINUED | OUTPATIENT
Start: 2018-10-25 | End: 2018-10-25

## 2018-10-25 RX ORDER — LIDOCAINE HYDROCHLORIDE 20 MG/ML
INJECTION, SOLUTION INFILTRATION; PERINEURAL PRN
Status: DISCONTINUED | OUTPATIENT
Start: 2018-10-25 | End: 2018-10-25

## 2018-10-25 RX ORDER — BUPIVACAINE HYDROCHLORIDE AND EPINEPHRINE 5; 5 MG/ML; UG/ML
INJECTION, SOLUTION PERINEURAL PRN
Status: DISCONTINUED | OUTPATIENT
Start: 2018-10-25 | End: 2018-10-25 | Stop reason: HOSPADM

## 2018-10-25 RX ORDER — ACETAMINOPHEN 325 MG/1
975 TABLET ORAL
Status: DISCONTINUED | OUTPATIENT
Start: 2018-10-25 | End: 2018-10-26 | Stop reason: HOSPADM

## 2018-10-25 RX ORDER — OXYCODONE HYDROCHLORIDE 5 MG/1
5-10 TABLET ORAL
Status: DISCONTINUED | OUTPATIENT
Start: 2018-10-25 | End: 2018-10-25

## 2018-10-25 RX ORDER — SODIUM CHLORIDE, SODIUM LACTATE, POTASSIUM CHLORIDE, CALCIUM CHLORIDE 600; 310; 30; 20 MG/100ML; MG/100ML; MG/100ML; MG/100ML
INJECTION, SOLUTION INTRAVENOUS CONTINUOUS
Status: DISCONTINUED | OUTPATIENT
Start: 2018-10-25 | End: 2018-10-25 | Stop reason: HOSPADM

## 2018-10-25 RX ORDER — HYDROMORPHONE HYDROCHLORIDE 1 MG/ML
.3-.5 INJECTION, SOLUTION INTRAMUSCULAR; INTRAVENOUS; SUBCUTANEOUS
Status: DISCONTINUED | OUTPATIENT
Start: 2018-10-25 | End: 2018-10-26 | Stop reason: HOSPADM

## 2018-10-25 RX ORDER — LIDOCAINE 40 MG/G
CREAM TOPICAL
Status: DISCONTINUED | OUTPATIENT
Start: 2018-10-25 | End: 2018-10-26 | Stop reason: HOSPADM

## 2018-10-25 RX ORDER — CEFAZOLIN SODIUM IN 0.9 % NACL 3 G/100 ML
3 INTRAVENOUS SOLUTION, PIGGYBACK (ML) INTRAVENOUS
Status: COMPLETED | OUTPATIENT
Start: 2018-10-25 | End: 2018-10-25

## 2018-10-25 RX ORDER — NALOXONE HYDROCHLORIDE 0.4 MG/ML
.1-.4 INJECTION, SOLUTION INTRAMUSCULAR; INTRAVENOUS; SUBCUTANEOUS
Status: DISCONTINUED | OUTPATIENT
Start: 2018-10-25 | End: 2018-10-26 | Stop reason: HOSPADM

## 2018-10-25 RX ORDER — VECURONIUM BROMIDE 1 MG/ML
INJECTION, POWDER, LYOPHILIZED, FOR SOLUTION INTRAVENOUS PRN
Status: DISCONTINUED | OUTPATIENT
Start: 2018-10-25 | End: 2018-10-25

## 2018-10-25 RX ORDER — SODIUM CHLORIDE 9 MG/ML
INJECTION, SOLUTION INTRAVENOUS CONTINUOUS
Status: DISCONTINUED | OUTPATIENT
Start: 2018-10-25 | End: 2018-10-25

## 2018-10-25 RX ORDER — ONDANSETRON 4 MG/1
4 TABLET, ORALLY DISINTEGRATING ORAL EVERY 6 HOURS PRN
Status: DISCONTINUED | OUTPATIENT
Start: 2018-10-25 | End: 2018-10-26 | Stop reason: HOSPADM

## 2018-10-25 RX ORDER — PROPOFOL 10 MG/ML
INJECTION, EMULSION INTRAVENOUS PRN
Status: DISCONTINUED | OUTPATIENT
Start: 2018-10-25 | End: 2018-10-25

## 2018-10-25 RX ORDER — FENTANYL CITRATE 50 UG/ML
25-50 INJECTION, SOLUTION INTRAMUSCULAR; INTRAVENOUS
Status: DISCONTINUED | OUTPATIENT
Start: 2018-10-25 | End: 2018-10-25 | Stop reason: HOSPADM

## 2018-10-25 RX ORDER — PROCHLORPERAZINE MALEATE 10 MG
10 TABLET ORAL EVERY 6 HOURS PRN
Status: DISCONTINUED | OUTPATIENT
Start: 2018-10-25 | End: 2018-10-26 | Stop reason: HOSPADM

## 2018-10-25 RX ORDER — CYCLOBENZAPRINE HCL 5 MG
5 TABLET ORAL 3 TIMES DAILY PRN
Status: DISCONTINUED | OUTPATIENT
Start: 2018-10-25 | End: 2018-10-26 | Stop reason: HOSPADM

## 2018-10-25 RX ORDER — GLYCOPYRROLATE 0.2 MG/ML
INJECTION, SOLUTION INTRAMUSCULAR; INTRAVENOUS PRN
Status: DISCONTINUED | OUTPATIENT
Start: 2018-10-25 | End: 2018-10-25

## 2018-10-25 RX ORDER — ONDANSETRON 4 MG/1
4 TABLET, ORALLY DISINTEGRATING ORAL EVERY 30 MIN PRN
Status: DISCONTINUED | OUTPATIENT
Start: 2018-10-25 | End: 2018-10-25 | Stop reason: HOSPADM

## 2018-10-25 RX ORDER — AMOXICILLIN 250 MG
1 CAPSULE ORAL 2 TIMES DAILY
Status: DISCONTINUED | OUTPATIENT
Start: 2018-10-25 | End: 2018-10-26 | Stop reason: HOSPADM

## 2018-10-25 RX ORDER — NEOSTIGMINE METHYLSULFATE 1 MG/ML
VIAL (ML) INJECTION PRN
Status: DISCONTINUED | OUTPATIENT
Start: 2018-10-25 | End: 2018-10-25

## 2018-10-25 RX ORDER — ONDANSETRON 2 MG/ML
4 INJECTION INTRAMUSCULAR; INTRAVENOUS EVERY 30 MIN PRN
Status: DISCONTINUED | OUTPATIENT
Start: 2018-10-25 | End: 2018-10-25 | Stop reason: HOSPADM

## 2018-10-25 RX ADMIN — Medication 0.5 MG: at 06:23

## 2018-10-25 RX ADMIN — FENTANYL CITRATE 100 MCG: 50 INJECTION, SOLUTION INTRAMUSCULAR; INTRAVENOUS at 12:20

## 2018-10-25 RX ADMIN — CYCLOBENZAPRINE HYDROCHLORIDE 5 MG: 5 TABLET, FILM COATED ORAL at 20:25

## 2018-10-25 RX ADMIN — SENNOSIDES AND DOCUSATE SODIUM 2 TABLET: 8.6; 5 TABLET ORAL at 22:40

## 2018-10-25 RX ADMIN — MIDAZOLAM 2 MG: 1 INJECTION INTRAMUSCULAR; INTRAVENOUS at 12:30

## 2018-10-25 RX ADMIN — DEXTROSE MONOHYDRATE 5 G: 25 INJECTION, SOLUTION INTRAVENOUS at 13:36

## 2018-10-25 RX ADMIN — HYDROMORPHONE HYDROCHLORIDE 0.5 MG: 1 INJECTION, SOLUTION INTRAMUSCULAR; INTRAVENOUS; SUBCUTANEOUS at 13:13

## 2018-10-25 RX ADMIN — SODIUM CHLORIDE: 9 INJECTION, SOLUTION INTRAVENOUS at 16:49

## 2018-10-25 RX ADMIN — CEFAZOLIN SODIUM 2 G: 2 INJECTION, SOLUTION INTRAVENOUS at 22:36

## 2018-10-25 RX ADMIN — DEXMEDETOMIDINE HYDROCHLORIDE 12 MCG: 100 INJECTION, SOLUTION INTRAVENOUS at 14:49

## 2018-10-25 RX ADMIN — HYDROMORPHONE HYDROCHLORIDE 2 MG: 2 TABLET ORAL at 20:26

## 2018-10-25 RX ADMIN — KETOROLAC TROMETHAMINE 30 MG: 30 INJECTION, SOLUTION INTRAMUSCULAR at 22:40

## 2018-10-25 RX ADMIN — HYDROMORPHONE HYDROCHLORIDE 2 MG: 2 TABLET ORAL at 08:01

## 2018-10-25 RX ADMIN — ASPIRIN 325 MG: 325 TABLET, DELAYED RELEASE ORAL at 18:17

## 2018-10-25 RX ADMIN — HYDROMORPHONE HYDROCHLORIDE 2 MG: 2 TABLET ORAL at 23:12

## 2018-10-25 RX ADMIN — GLYCOPYRROLATE 0.8 MG: 0.2 INJECTION, SOLUTION INTRAMUSCULAR; INTRAVENOUS at 14:46

## 2018-10-25 RX ADMIN — SODIUM CHLORIDE, POTASSIUM CHLORIDE, SODIUM LACTATE AND CALCIUM CHLORIDE: 600; 310; 30; 20 INJECTION, SOLUTION INTRAVENOUS at 14:23

## 2018-10-25 RX ADMIN — ONDANSETRON 4 MG: 2 INJECTION INTRAMUSCULAR; INTRAVENOUS at 13:57

## 2018-10-25 RX ADMIN — ACETAMINOPHEN 975 MG: 325 TABLET, FILM COATED ORAL at 18:43

## 2018-10-25 RX ADMIN — SODIUM CHLORIDE, POTASSIUM CHLORIDE, SODIUM LACTATE AND CALCIUM CHLORIDE: 600; 310; 30; 20 INJECTION, SOLUTION INTRAVENOUS at 11:50

## 2018-10-25 RX ADMIN — VECURONIUM BROMIDE 1 MG: 1 INJECTION, POWDER, LYOPHILIZED, FOR SOLUTION INTRAVENOUS at 13:57

## 2018-10-25 RX ADMIN — KETOROLAC TROMETHAMINE 30 MG: 30 INJECTION, SOLUTION INTRAMUSCULAR at 18:17

## 2018-10-25 RX ADMIN — CYCLOBENZAPRINE HYDROCHLORIDE 5 MG: 5 TABLET, FILM COATED ORAL at 08:09

## 2018-10-25 RX ADMIN — FENTANYL CITRATE 50 MCG: 50 INJECTION, SOLUTION INTRAMUSCULAR; INTRAVENOUS at 16:25

## 2018-10-25 RX ADMIN — HYDROMORPHONE HYDROCHLORIDE 2 MG: 2 TABLET ORAL at 10:49

## 2018-10-25 RX ADMIN — PROPOFOL 200 MG: 10 INJECTION, EMULSION INTRAVENOUS at 12:20

## 2018-10-25 RX ADMIN — HYDROMORPHONE HYDROCHLORIDE 2 MG: 2 TABLET ORAL at 02:37

## 2018-10-25 RX ADMIN — SUCCINYLCHOLINE CHLORIDE 200 MG: 20 INJECTION, SOLUTION INTRAMUSCULAR; INTRAVENOUS; PARENTERAL at 12:21

## 2018-10-25 RX ADMIN — FENTANYL CITRATE 50 MCG: 50 INJECTION, SOLUTION INTRAMUSCULAR; INTRAVENOUS at 16:01

## 2018-10-25 RX ADMIN — DEXMEDETOMIDINE HYDROCHLORIDE 8 MCG: 100 INJECTION, SOLUTION INTRAVENOUS at 12:57

## 2018-10-25 RX ADMIN — NEOSTIGMINE METHYLSULFATE 5 MG: 1 INJECTION, SOLUTION INTRAVENOUS at 14:47

## 2018-10-25 RX ADMIN — VECURONIUM BROMIDE 2 MG: 1 INJECTION, POWDER, LYOPHILIZED, FOR SOLUTION INTRAVENOUS at 13:00

## 2018-10-25 RX ADMIN — DEXMEDETOMIDINE HYDROCHLORIDE 12 MCG: 100 INJECTION, SOLUTION INTRAVENOUS at 12:48

## 2018-10-25 RX ADMIN — INSULIN GLARGINE 45 UNITS: 100 INJECTION, SOLUTION SUBCUTANEOUS at 22:55

## 2018-10-25 RX ADMIN — LIDOCAINE HYDROCHLORIDE 100 MG: 20 INJECTION, SOLUTION INFILTRATION; PERINEURAL at 12:20

## 2018-10-25 RX ADMIN — POTASSIUM CHLORIDE, DEXTROSE MONOHYDRATE AND SODIUM CHLORIDE: 150; 5; 450 INJECTION, SOLUTION INTRAVENOUS at 08:15

## 2018-10-25 RX ADMIN — Medication 3 G: at 12:29

## 2018-10-25 RX ADMIN — FENTANYL CITRATE 100 MCG: 50 INJECTION, SOLUTION INTRAMUSCULAR; INTRAVENOUS at 12:05

## 2018-10-25 RX ADMIN — Medication 1 G: at 14:30

## 2018-10-25 RX ADMIN — DEXAMETHASONE SODIUM PHOSPHATE 4 MG: 4 INJECTION, SOLUTION INTRA-ARTICULAR; INTRALESIONAL; INTRAMUSCULAR; INTRAVENOUS; SOFT TISSUE at 12:41

## 2018-10-25 RX ADMIN — ROCURONIUM BROMIDE 50 MG: 10 INJECTION INTRAVENOUS at 12:23

## 2018-10-25 RX ADMIN — HYDROMORPHONE HYDROCHLORIDE 0.25 MG: 1 INJECTION, SOLUTION INTRAMUSCULAR; INTRAVENOUS; SUBCUTANEOUS at 14:59

## 2018-10-25 ASSESSMENT — PAIN DESCRIPTION - DESCRIPTORS: DESCRIPTORS: THROBBING

## 2018-10-25 ASSESSMENT — LIFESTYLE VARIABLES: TOBACCO_USE: 0

## 2018-10-25 NOTE — ANESTHESIA POSTPROCEDURE EVALUATION
Patient: Moon Mg    Procedure(s):  OPEN REDUCTION INTERNAL FIXATION HUMERUS PROXIMAL    Diagnosis:HUMERUS FRACTURE.   Diagnosis Additional Information: No value filed.    Anesthesia Type:  General, ETT, RSI    Note:  Anesthesia Post Evaluation    Patient location during evaluation: PACU  Patient participation: Able to fully participate in evaluation  Level of consciousness: awake  Pain management: adequate  Airway patency: patent  Cardiovascular status: acceptable  Respiratory status: acceptable  Hydration status: acceptable  PONV: none             Last vitals:  Vitals:    10/25/18 1049 10/25/18 1516 10/25/18 1520   BP:  142/82 127/76   Pulse:      Resp: 16  30   Temp:   36.4  C (97.5  F)   SpO2:  96% 97%         Electronically Signed By: Jair Araujo MD  October 25, 2018  3:34 PM

## 2018-10-25 NOTE — PROGRESS NOTES
"S:  No acute events overnight. Pain well controlled. Discussed with family and would like to proceed with surgery.    O:  /78 (BP Location: Right arm)  Pulse 86  Temp 98.1  F (36.7  C)  Resp 16  Ht 1.575 m (5' 2\")  Wt 138.3 kg (305 lb)  SpO2 98%  BMI 55.79 kg/m2      LUE:  Splint in place  NVI distally    A/P:  48 year old female with L proximal humerus shaft fracture    - plan for ORIF L proximal humerus shaft today                "

## 2018-10-25 NOTE — BRIEF OP NOTE
[facility] Brief Operative Note    Pre-operative diagnosis: HUMERUS FRACTURE.    Post-operative diagnosis Midshaft humerus fracture   Procedure: Procedure(s):  OPEN REDUCTION INTERNAL FIXATION HUMERUS PROXIMAL   Surgeon(s): Surgeon(s) and Role:     * Pietro Boone MD - Primary     * Shanti Moran PA-C - Assisting   Estimated blood loss: 850 mL    Specimens: * No specimens in log *   Findings: See operative dictation    NWB SAL Jay  Ok to work on codman's and pendulums  PT/OT  dvt prophylaxis while in hospital and traveling  Likely plan for discharge tomorrow

## 2018-10-25 NOTE — PROGRESS NOTES
[facility]    Hospitalist Progress Note    Assessment & Plan   Moon Mg is a 48 year old female with a history of DM type II and hypothyroidism who was admitted on 10/24/2018 for a left humerus fracture.  I was asked to see the patient for medical co-management and pre-op evaluation      Left humerus fracture  Fell against a door frame fracturing her left humerus.  Imaging showed an oblique displaced fracture through the shaft of the humerus.  Patient was admitted by orthopedic surgery for further pain control and possible surgical intervention   - Will defer pain control and briseyda-operative management to orthopedic surgery if they decide to operate   -  moderate risk for a moderate risk surgery if they decide to operate.  Patient is morbidly obese so it is difficulty to assess her metabolic activity but assume it is >4.  She does get short of breath with significant exertion but no chest pain.  Will obtain EKG in mean time    -taken to OR 10/25.   S/p ORIF L proximal humerus shaft   Pain control per ortho  Doing well post op    DM type II   Patient isn't too forth coming with what her home sugars have been recently but does note they were high a couple weeks ago because she was on Prednisone but is no longer on that.  No HgbA1c on file.  PTA on Lantus 80 U at bedtime and SSI   - HgbA1c 7.3%  - decreased Lantus to 60 U and adjust as needed - had lantus 60 units X 1 2300 last night.   -will give 45 units tonight as not likely to eat tonight  - Medium intensity sliding scale insulin- q4 hours for now as not eating  -NS- add dextrose if low BS  - Moderate CHO diet      Hypothyroidism   No TSH on file  - PTA Synthroid 175 mcg      Morbid obesity  - Patient to follow up with PCP   -hx of snoring but denies apneas or daytime somnolence  -frequent sats/continuous oximetry as ordered  - freq vitals as ordered post o   - aggressive IS, mobilize     DVT Prophylaxis: Defer to primary service    Code  Status: Full code     Disposition: Expected discharge per primary service - possibly tomorrow or Saturday depending on pain control       Jorge Paul MD  Text Page  (7am to 6pm)  Interval History   Seen postoperatively did well in surgery. No complications  No cp,sob,n/v/d/abd pain  Adequate pain control    -Data reviewed today: I reviewed all new labs and imaging results over the last 24 hours. I personally reviewed imaging and labs since admission      Physical Exam   Temp: 98.1  F (36.7  C) Temp src: Oral BP: 133/78 Pulse: 86   Resp: 16 SpO2: 98 % O2 Device: None (Room air) Oxygen Delivery: 2 LPM  Vitals:    10/24/18 1410   Weight: 138.3 kg (305 lb)     Vital Signs with Ranges  Temp:  [98.1  F (36.7  C)-98.9  F (37.2  C)] 98.1  F (36.7  C)  Pulse:  [86-99] 86  Resp:  [14-18] 16  BP: (122-161)/() 133/78  SpO2:  [91 %-98 %] 98 %       Constitutional: Obese, laying in bed, nad, nontoxic  Respiratory: Breathing easily, clear anteriorly  Cardiovascular: RRR not tachy. No murmur  GI: soft, NT  Skin/Integumen: no rash or edema  MSK: LUE in sling, incision bandaged  Neuro/Psych: sedated,sleepy, coherent, responds appropriately to questions      Medications     dextrose 5% and 0.45% NaCl + KCl 20 mEq/L 75 mL/hr at 10/25/18 0815     lactated ringers         ceFAZolin  1 g Intravenous See Admin Instructions     [Auto Hold] insulin aspart  1-7 Units Subcutaneous TID AC     [Auto Hold] insulin aspart  1-5 Units Subcutaneous At Bedtime     [Auto Hold] insulin glargine  60 Units Subcutaneous At Bedtime     [Auto Hold] levothyroxine  175 mcg Oral Daily     sodium chloride (PF)  3 mL Intracatheter Q8H     [Auto Hold] sodium chloride (PF)  3 mL Intracatheter Q8H       Data     Recent Labs  Lab 10/24/18  1613   WBC 14.8*   HGB 15.2   MCV 85         POTASSIUM 4.2   CHLORIDE 107   CO2 26   BUN 17   CR 0.69   ANIONGAP 8   ANDREINA 9.4   *       Imaging:   Recent Results (from the past 24 hour(s))   XR  Humerus Left G/E 2 Views    Narrative    HUMERUS LEFT TWO OR MORE VIEWS October 24, 2018 2:56 PM     HISTORY: Pain after trauma.    COMPARISON: None.      Impression    IMPRESSION: There is an oblique displaced fracture through the shaft  of the humerus between the proximal one third and the distal two  thirds. The distal fracture fragment is displaced at least 2 cm  laterally and there is shortening of the humerus. Glenohumeral  articulation maintained.    CORNELIUS MASCORRO MD

## 2018-10-25 NOTE — ED NOTES
RECEIVING UNIT ED HANDOFF REVIEW    ED Nurse Handoff Report was reviewed by: Mendez Nuñez on October 24, 2018 at 7:55 PM

## 2018-10-25 NOTE — PLAN OF CARE
Problem: Patient Care Overview  Goal: Plan of Care/Patient Progress Review  Outcome: No Change  Pt is A&Ox4. VSS on 2 L of oxygen NC, afebrile. Up with SBA of 1. Voiding adequately per bathroom. Pain managed by IV, PO dilaudid and flexeril. NPO after midnight. Sling and ace wrap in place on the LUE. Will continue to monitor.

## 2018-10-25 NOTE — ED NOTES
Roldantent still having quite a bit of pain. Patient tried to sit on side of bed as her back and buttocks are hurting but pain is too much. Called 55 and got word that she will be transferring shortly after room is cleaned. updated patient and gave pain medications. Patient is more comfortable

## 2018-10-25 NOTE — ANESTHESIA PREPROCEDURE EVALUATION
Procedure: Procedure(s):  OPEN REDUCTION INTERNAL FIXATION HUMERUS PROXIMAL  Preop diagnosis: HUMERUS FRACTURE.     Allergies   Allergen Reactions     Contrast Dye        Past Medical History:   Diagnosis Date     Diabetes (H)      Thyroid disease        Past Surgical History:   Procedure Laterality Date     BACK SURGERY       GYN SURGERY         Social History   Substance Use Topics     Smoking status: Never Smoker     Smokeless tobacco: Not on file     Alcohol use Yes      Comment: socially       Prior to Admission medications    Medication Sig Start Date End Date Taking? Authorizing Provider   cetirizine (ZYRTEC) 10 MG tablet Take 10 mg by mouth daily   Yes Unknown, Entered By History   insulin glargine (LANTUS) 100 UNIT/ML injection Inject 80 Units Subcutaneous At Bedtime    Yes Reported, Patient   insulin lispro (HUMALOG PEN) 100 UNIT/ML injection Inject Subcutaneous 3 times daily (before meals) Per sliding scale. Pt not sure about the dosing. Most of the times she does not use.   Yes Unknown, Entered By History   levothyroxine (SYNTHROID/LEVOTHROID) 175 MCG tablet Take by mouth daily 1 tablet (175 mcg daily) except 2 tablets (350 mcg) on Wednesdays.   Yes Unknown, Entered By History     Current Facility-Administered Medications Ordered in Epic   Medication Dose Route Frequency Last Rate Last Dose     acetaminophen (TYLENOL) tablet 650 mg  650 mg Oral Q4H PRN        Or     acetaminophen (TYLENOL) Suppository 650 mg  650 mg Rectal Q4H PRN         cyclobenzaprine (FLEXERIL) tablet 5 mg  5 mg Oral TID PRN   5 mg at 10/25/18 0809     dextrose 5% and 0.45% NaCl + KCl 20 mEq/L infusion   Intravenous Continuous 75 mL/hr at 10/25/18 0815       glucose gel 15-30 g  15-30 g Oral Q15 Min PRN        Or     dextrose 50 % injection 25-50 mL  25-50 mL Intravenous Q15 Min PRN        Or     glucagon injection 1 mg  1 mg Subcutaneous Q15 Min PRN         HYDROmorphone (DILAUDID) tablet 2 mg  2 mg Oral Q3H PRN   2 mg at 10/25/18  0801     HYDROmorphone (PF) (DILAUDID) injection 0.3-0.5 mg  0.3-0.5 mg Intravenous Q2H PRN   0.5 mg at 10/25/18 0623     insulin aspart (NovoLOG) inj (RAPID ACTING)  1-7 Units Subcutaneous TID AC         insulin aspart (NovoLOG) inj (RAPID ACTING)  1-5 Units Subcutaneous At Bedtime         insulin glargine (LANTUS) injection 60 Units  60 Units Subcutaneous At Bedtime   60 Units at 10/24/18 2332     levothyroxine (SYNTHROID/LEVOTHROID) tablet 175 mcg  175 mcg Oral Daily   Stopped at 10/25/18 0827     naloxone (NARCAN) injection 0.1-0.4 mg  0.1-0.4 mg Intravenous Q2 Min PRN         sodium chloride (PF) 0.9% PF flush 3 mL  3 mL Intracatheter Q1H PRN         sodium chloride (PF) 0.9% PF flush 3 mL  3 mL Intracatheter Q8H         No current [facility]-ordered outpatient prescriptions on file.       dextrose 5% and 0.45% NaCl + KCl 20 mEq/L 75 mL/hr at 10/25/18 0815       Wt Readings from Last 1 Encounters:   10/24/18 138.3 kg (305 lb)     Temp Readings from Last 1 Encounters:   10/25/18 36.7  C (98.1  F)     BP Readings from Last 6 Encounters:   10/25/18 133/78     Pulse Readings from Last 4 Encounters:   10/25/18 86     Resp Readings from Last 1 Encounters:   10/25/18 17     SpO2 Readings from Last 1 Encounters:   10/25/18 98%     Recent Labs   Lab Test  10/24/18   1613   NA  141   POTASSIUM  4.2   CHLORIDE  107   CO2  26   ANIONGAP  8   GLC  154*   BUN  17   CR  0.69   ANDREINA  9.4     No results for input(s): AST, ALT, ALKPHOS, BILITOTAL, LIPASE in the last 67491 hours.  Recent Labs   Lab Test  10/24/18   1613   WBC  14.8*   HGB  15.2   PLT  347     No results for input(s): ABO, RH in the last 35846 hours.  No results for input(s): INR, PTT in the last 40987 hours.   No results for input(s): TROPI in the last 56617 hours.  No results for input(s): PH, PCO2, PO2, HCO3 in the last 46886 hours.  No results for input(s): HCG in the last 30688 hours.    Recent Results (from the past 744 hour(s))   XR Humerus Left G/E 2 Views     Narrative    HUMERUS LEFT TWO OR MORE VIEWS October 24, 2018 2:56 PM     HISTORY: Pain after trauma.    COMPARISON: None.      Impression    IMPRESSION: There is an oblique displaced fracture through the shaft  of the humerus between the proximal one third and the distal two  thirds. The distal fracture fragment is displaced at least 2 cm  laterally and there is shortening of the humerus. Glenohumeral  articulation maintained.    CORNELIUS MASCORRO MD       Anesthesia Evaluation     . Pt has had prior anesthetic. Type: General    No history of anesthetic complications          ROS/MED HX    ENT/Pulmonary:     (+)YUN risk factors snores loudly, obese, , . .   (-) tobacco use and sleep apnea   Neurologic:  - neg neurologic ROS     Cardiovascular:  - neg cardiovascular ROS       METS/Exercise Tolerance:  4 - Raking leaves, gardening   Hematologic:         Musculoskeletal:   (+) arthritis, fracture upper extremity: Other (comment) (Humerus ), , -       GI/Hepatic:        (-) GERD and hiatal hernia   Renal/Genitourinary:  - ROS Renal section negative       Endo:     (+) type II DM Using insulin - not using insulin pump thyroid problem hypothyroidism, Obesity, .      Psychiatric:  - neg psychiatric ROS       Infectious Disease:  - neg infectious disease ROS       Malignancy:      - no malignancy   Other:    - neg other ROS                 Physical Exam  Normal systems: cardiovascular, pulmonary and dental    Airway   Mallampati: III  TM distance: >3 FB  Neck ROM: full    Dental     Cardiovascular   Rhythm and rate: regular and normal      Pulmonary                     Anesthesia Plan      History & Physical Review  History and physical reviewed and following examination; no interval change.    ASA Status:  3 .    NPO Status:  > 8 hours    Plan for General, ETT and RSI with Intravenous and Propofol induction. Maintenance will be Balanced and Inhalation.    PONV prophylaxis:  Ondansetron (or other 5HT-3) and Dexamethasone or  Solumedrol       Postoperative Care  Postoperative pain management:  IV analgesics and Oral pain medications.      Consents  Anesthetic plan, risks, benefits and alternatives discussed with:  Patient..                          .

## 2018-10-25 NOTE — PROGRESS NOTES
Patient has elected to move forward with surgery  Scheduled for 12/12:30 pending OR Availability    NPO  Pain medications as needed    Shanti Moran PAC  [phone]

## 2018-10-25 NOTE — ANESTHESIA CARE TRANSFER NOTE
Patient: Moon Mg    Procedure(s):  OPEN REDUCTION INTERNAL FIXATION HUMERUS PROXIMAL    Diagnosis: HUMERUS FRACTURE.   Diagnosis Additional Information: No value filed.    Anesthesia Type:   General, ETT, RSI     Note:  Airway :Face Mask  Patient transferred to:PACU  Comments: Pt exhibits spont resps, all monitors and alarms on in pacu, report given to RN, vss.Handoff Report: Identifed the Patient, Identified the Reponsible Provider, Reviewed the pertinent medical history, Discussed the surgical course, Reviewed Intra-OP anesthesia mangement and issues during anesthesia, Set expectations for post-procedure period and Allowed opportunity for questions and acknowledgement of understanding      Vitals: (Last set prior to Anesthesia Care Transfer)    CRNA VITALS  10/25/2018 1444 - 10/25/2018 1522      10/25/2018             Pulse: 77    SpO2: 98 %    Resp Rate (set): 10                Electronically Signed By: MICHELLE Schwarz CRNA  October 25, 2018  3:22 PM

## 2018-10-26 ENCOUNTER — APPOINTMENT (OUTPATIENT)
Dept: OCCUPATIONAL THERAPY | Facility: CLINIC | Age: 48
End: 2018-10-26
Attending: PHYSICIAN ASSISTANT
Payer: OTHER MISCELLANEOUS

## 2018-10-26 VITALS
HEIGHT: 62 IN | WEIGHT: 293 LBS | BODY MASS INDEX: 53.92 KG/M2 | HEART RATE: 72 BPM | OXYGEN SATURATION: 92 % | TEMPERATURE: 98.7 F | RESPIRATION RATE: 16 BRPM | SYSTOLIC BLOOD PRESSURE: 127 MMHG | DIASTOLIC BLOOD PRESSURE: 74 MMHG

## 2018-10-26 LAB
GLUCOSE BLDC GLUCOMTR-MCNC: 132 MG/DL (ref 70–99)
GLUCOSE BLDC GLUCOMTR-MCNC: 146 MG/DL (ref 70–99)
GLUCOSE BLDC GLUCOMTR-MCNC: 95 MG/DL (ref 70–99)
HGB BLD-MCNC: 11.7 G/DL (ref 11.7–15.7)

## 2018-10-26 PROCEDURE — 25000128 H RX IP 250 OP 636: Performed by: PHYSICIAN ASSISTANT

## 2018-10-26 PROCEDURE — 25000132 ZZH RX MED GY IP 250 OP 250 PS 637: Performed by: PHYSICIAN ASSISTANT

## 2018-10-26 PROCEDURE — 97110 THERAPEUTIC EXERCISES: CPT | Mod: GO

## 2018-10-26 PROCEDURE — G0378 HOSPITAL OBSERVATION PER HR: HCPCS

## 2018-10-26 PROCEDURE — 36415 COLL VENOUS BLD VENIPUNCTURE: CPT | Performed by: PHYSICIAN ASSISTANT

## 2018-10-26 PROCEDURE — 99225 ZZC SUBSEQUENT OBSERVATION CARE,LEVEL II: CPT | Performed by: INTERNAL MEDICINE

## 2018-10-26 PROCEDURE — 25000132 ZZH RX MED GY IP 250 OP 250 PS 637: Performed by: ORTHOPAEDIC SURGERY

## 2018-10-26 PROCEDURE — 99207 ZZC CDG-CODE CATEGORY CHANGED: CPT | Performed by: INTERNAL MEDICINE

## 2018-10-26 PROCEDURE — 85018 HEMOGLOBIN: CPT | Performed by: PHYSICIAN ASSISTANT

## 2018-10-26 PROCEDURE — 00000146 ZZHCL STATISTIC GLUCOSE BY METER IP

## 2018-10-26 PROCEDURE — 97535 SELF CARE MNGMENT TRAINING: CPT | Mod: GO

## 2018-10-26 PROCEDURE — 40000133 ZZH STATISTIC OT WARD VISIT

## 2018-10-26 PROCEDURE — 97165 OT EVAL LOW COMPLEX 30 MIN: CPT | Mod: GO

## 2018-10-26 RX ORDER — HYDROMORPHONE HYDROCHLORIDE 2 MG/1
2-4 TABLET ORAL EVERY 4 HOURS PRN
Qty: 30 TABLET | Refills: 0 | Status: SHIPPED | OUTPATIENT
Start: 2018-10-26

## 2018-10-26 RX ORDER — AMOXICILLIN 250 MG
2 CAPSULE ORAL 2 TIMES DAILY
Qty: 40 TABLET | Refills: 0 | Status: SHIPPED | OUTPATIENT
Start: 2018-10-26

## 2018-10-26 RX ORDER — CYCLOBENZAPRINE HCL 5 MG
5 TABLET ORAL 3 TIMES DAILY PRN
Qty: 30 TABLET | Refills: 0 | Status: SHIPPED | OUTPATIENT
Start: 2018-10-26

## 2018-10-26 RX ORDER — HYDROXYZINE HYDROCHLORIDE 25 MG/1
25 TABLET, FILM COATED ORAL EVERY 6 HOURS PRN
Qty: 30 TABLET | Refills: 0 | Status: SHIPPED | OUTPATIENT
Start: 2018-10-26

## 2018-10-26 RX ORDER — ACETAMINOPHEN 325 MG/1
650 TABLET ORAL EVERY 4 HOURS PRN
Qty: 40 TABLET | Refills: 0 | Status: SHIPPED | OUTPATIENT
Start: 2018-10-28

## 2018-10-26 RX ORDER — NICOTINE POLACRILEX 4 MG
15-30 LOZENGE BUCCAL
Status: DISCONTINUED | OUTPATIENT
Start: 2018-10-26 | End: 2018-10-26 | Stop reason: HOSPADM

## 2018-10-26 RX ORDER — ONDANSETRON 4 MG/1
4 TABLET, ORALLY DISINTEGRATING ORAL EVERY 6 HOURS PRN
Qty: 10 TABLET | Refills: 0 | Status: SHIPPED | OUTPATIENT
Start: 2018-10-26

## 2018-10-26 RX ORDER — ASPIRIN 325 MG
325 TABLET, DELAYED RELEASE (ENTERIC COATED) ORAL DAILY
Qty: 30 TABLET | Refills: 0 | Status: SHIPPED | OUTPATIENT
Start: 2018-10-26

## 2018-10-26 RX ORDER — DEXTROSE MONOHYDRATE 25 G/50ML
25-50 INJECTION, SOLUTION INTRAVENOUS
Status: DISCONTINUED | OUTPATIENT
Start: 2018-10-26 | End: 2018-10-26 | Stop reason: HOSPADM

## 2018-10-26 RX ADMIN — ACETAMINOPHEN 975 MG: 325 TABLET, FILM COATED ORAL at 09:37

## 2018-10-26 RX ADMIN — SENNOSIDES AND DOCUSATE SODIUM 1 TABLET: 8.6; 5 TABLET ORAL at 09:39

## 2018-10-26 RX ADMIN — LEVOTHYROXINE SODIUM 175 MCG: 100 TABLET ORAL at 09:38

## 2018-10-26 RX ADMIN — CEFAZOLIN SODIUM 2 G: 2 INJECTION, SOLUTION INTRAVENOUS at 05:47

## 2018-10-26 RX ADMIN — HYDROMORPHONE HYDROCHLORIDE 2 MG: 2 TABLET ORAL at 05:20

## 2018-10-26 RX ADMIN — ACETAMINOPHEN 975 MG: 325 TABLET, FILM COATED ORAL at 00:43

## 2018-10-26 RX ADMIN — KETOROLAC TROMETHAMINE 30 MG: 30 INJECTION, SOLUTION INTRAMUSCULAR at 05:20

## 2018-10-26 RX ADMIN — HYDROMORPHONE HYDROCHLORIDE 2 MG: 2 TABLET ORAL at 09:41

## 2018-10-26 RX ADMIN — ASPIRIN 325 MG: 325 TABLET, DELAYED RELEASE ORAL at 09:39

## 2018-10-26 ASSESSMENT — ACTIVITIES OF DAILY LIVING (ADL): PREVIOUS_RESPONSIBILITIES: MEAL PREP;HOUSEKEEPING;LAUNDRY;SHOPPING;MEDICATION MANAGEMENT;FINANCES;DRIVING;WORK

## 2018-10-26 NOTE — PLAN OF CARE
Problem: Patient Care Overview  Goal: Plan of Care/Patient Progress Review  Outcome: Improving  Pt is anxiously awaiting discharge.  Pt's brother at bedside.  Awaiting discharge medications.  Will continue to monitor.

## 2018-10-26 NOTE — DISCHARGE SUMMARY
Discharge Summary    Moon Mg MRN# [id number]   YOB: 1970 Age: 48 year old     Date of Admission:  10/24/2018  Date of Discharge:  10/26/2018  Admitting Physician:  Pietro Boone MD  Discharge Physician:  Shanti Moran PA-C     Primary Provider: System, Provider Not In11          Admission Diagnoses:   Left humeral shaft fracture          Discharge Diagnosis:   Same           Surgical Procedure:   Left humeral shaft fracture open reduction and internal fixation           Secondary Diagnosis:   None           Discharge Disposition:   Discharged to home           Medications Prior to Admission:     No prescriptions prior to admission.             Discharge Medications:     Discharge Medication List as of 10/26/2018 11:44 AM      START taking these medications    Details   acetaminophen (TYLENOL) 325 MG tablet Take 2 tablets (650 mg) by mouth every 4 hours as needed for pain, Disp-40 tablet, R-0, Local Print      aspirin 325 MG EC tablet Take 1 tablet (325 mg) by mouth daily, Disp-30 tablet, R-0, Local Print      cyclobenzaprine (FLEXERIL) 5 MG tablet Take 1 tablet (5 mg) by mouth 3 times daily as needed for muscle spasms, Disp-30 tablet, R-0, Local Print      HYDROmorphone (DILAUDID) 2 MG tablet Take 1-2 tablets (2-4 mg) by mouth every 4 hours as needed for moderate to severe pain, Disp-30 tablet, R-0, Local Print      hydrOXYzine (ATARAX) 25 MG tablet Take 1 tablet (25 mg) by mouth every 6 hours as needed for itching, Disp-30 tablet, R-0, Local Print      ondansetron (ZOFRAN-ODT) 4 MG ODT tab Take 1 tablet (4 mg) by mouth every 6 hours as needed for nausea or vomiting, Disp-10 tablet, R-0, E-Prescribe      senna-docusate (SENOKOT-S;PERICOLACE) 8.6-50 MG per tablet Take 2 tablets by mouth 2 times daily, Disp-40 tablet, R-0, Local Print         CONTINUE these medications which have NOT CHANGED    Details   cetirizine (ZYRTEC) 10 MG tablet Take 10 mg by mouth daily, Historical      insulin  glargine (LANTUS) 100 UNIT/ML injection Inject 80 Units Subcutaneous At Bedtime , Historical      insulin lispro (HUMALOG PEN) 100 UNIT/ML injection Inject Subcutaneous 3 times daily (before meals) Per sliding scale. Pt not sure about the dosing. Most of the times she does not use., Historical      levothyroxine (SYNTHROID/LEVOTHROID) 175 MCG tablet Take by mouth daily 1 tablet (175 mcg daily) except 2 tablets (350 mcg) on Wednesdays., Historical                   Consultations:   Consultation during this admission received from internal medicine.  No medical intervention was indicated.             Hospital Course:   The patient was admitted from the emergency room. Patient fell in doorway and hit left arm on door jam. Patient was here for work and lives in [place]. The patient underwent an uneventfulinternal fixation leftproximal humerus fracture. Postoperatively, anticoagulation Aspirin was prescribed due to patient needing to travel home post-operatively. No transfusion was required. The patient will be discharged to home. Home medications have been reconciled. Dilaudid 2 mg was prescribed for pain. Aspirin 325mg qd  will be prescribed.             Pending Results:   None           Discharge Instructions and Follow-Up:        Discharge activity: pendulum exercises  Sling  ROM ok wrist and elbow encouraged  Non-WB through left UE   Discharge follow-up: Follow up with orthopedic provider in [place] in 1-2 weeks   Outpatient therapy: None    Home Care agency: None    Supplies and equipment: None        Wound care: leave dressing in place   Other instructions: None

## 2018-10-26 NOTE — PLAN OF CARE
Problem: Shoulder Arthroplasty (Adult)  Goal: Signs and Symptoms of Listed Potential Problems Will be Absent, Minimized or Managed (Shoulder Arthroplasty)  Signs and symptoms of listed potential problems will be absent, minimized or managed by discharge/transition of care (reference Shoulder Arthroplasty (Adult) CPG).   Outcome: Improving  Pt is alert and oriented x4. VSS on 2 L of oxygen. CMS intact and dressing C/D/I. Sling in place on the LUE. Up with SBA of 1. Voiding adequately per BR. Pain managed by IV Toradol and PO dilaudid. IV SL. Progressing well per POC.

## 2018-10-26 NOTE — PROGRESS NOTES
10/26/18 0930   Quick Adds   Type of Visit Initial Occupational Therapy Evaluation   Living Environment   Lives With (parent )   Living Arrangements house  (2 story home )   Home Accessibility bed and bath on same level   Transportation Available car;family or friend will provide   Self-Care   Dominant Hand right   Usual Activity Tolerance good   Current Activity Tolerance good   Regular Exercise no   Equipment Currently Used at Home none   Functional Level Prior   Ambulation 0-->independent   Transferring 0-->independent   Toileting 0-->independent   Bathing 0-->independent   Dressing 0-->independent   Fall history within last six months yes   Number of times patient has fallen within last six months 1   General Information   Onset of Illness/Injury or Date of Surgery - Date 10/24/18   Referring Physician Shanti Moran PA-C   Patient/Family Goals Statement Home   Additional Occupational Profile Info/Pertinent History of Current Problem Per chart: OPEN REDUCTION INTERNAL FIXATION HUMERUS PROXIMAL   Weight-Bearing Status - LUE (NWB per chart )   Cognitive Status Examination   Orientation orientation to person, place and time   Level of Consciousness alert   Pain Assessment   Patient Currently in Pain Yes, see Vital Sign flowsheet   Transfer Skills   Transfer Transfer Safety Analysis Bed/Chair;Transfer Skill: Stand to Sit;Transfer Safety Analysis Sit/Stand   Transfer Skill: Bed to Chair/Chair to Bed   Level of [place]: Bed to Chair independent   Transfer Skill: Sit to Stand   Level of [place]: Sit/Stand independent   Toilet Transfer   Toilet Transfer Toilet Transfer Skill;Toilet Transfer Safety Analysis   Transfer Skill: Toilet Transfer   Level of [place]: Toilet independent   Lower Body Dressing   Level of [place]: Dress Lower Body minimum assist (75% patients effort)   Instrumental Activities of Daily Living (IADL)   Previous Responsibilities meal  "prep;housekeeping;laundry;shopping;medication management;finances;driving;work   General Therapy Interventions   Planned Therapy Interventions ADL retraining;IADL retraining;strengthening;transfer training   Clinical Impression   Criteria for Skilled Therapeutic Interventions Met yes, treatment indicated   OT Diagnosis Decreased endurance for I/ADLs   Influenced by the following impairments Decreased endurance for I/ADLs   Assessment of Occupational Performance 1-3 Performance Deficits   Identified Performance Deficits Decreased endurance for I/ADLs (dressing, bathing, toielting)   Clinical Decision Making (Complexity) Low complexity   Predicted Duration of Therapy Intervention (days/wks) evaluation and one treatment    Anticipated Discharge Disposition Home with Outpatient Therapy  (OP OT or Pt for LUE strengthening )   Risks and Benefits of Treatment have been explained. Yes   Patient, Family & other staff in agreement with plan of care Yes   [facility] TM \"6 Clicks\"   2016, Trustees of [facility], under license to [employer].  All rights reserved.   6 Clicks Short Forms Daily Activity Inpatient Short Form   [facility]  \"6 Clicks\" Daily Activity Inpatient Short Form   1. Putting on and taking off regular lower body clothing? 3 - A Little   2. Bathing (including washing, rinsing, drying)? 3 - A Little   3. Toileting, which includes using toilet, bedpan or urinal? 3 - A Little   4. Putting on and taking off regular upper body clothing? 3 - A Little   5. Taking care of personal grooming such as brushing teeth? 3 - A Little   6. Eating meals? 3 - A Little   Daily Activity Raw Score (Score out of 24.Lower scores equate to lower levels of function) 18   Total Evaluation Time   Total Evaluation Time (Minutes) 8     "

## 2018-10-26 NOTE — PROGRESS NOTES
Orthopedic Surgery  Moon Mg  10/26/2018  Admit Date:  10/24/2018  POD # 1  S/P left humeral shaft fracture ORIF    Patient resting comfortably in bed.    Pain controlled.  Tolerating oral intake.    Denies nausea or vomiting  Denies chest pain or shortness of breath  No events overnight.     Alert and orient to person, place, and time.  Vital Sign Ranges  Temperature Temp  Av.5  F (36.9  C)  Min: 97.5  F (36.4  C)  Max: 99.2  F (37.3  C)   Blood pressure Systolic (24hrs), Av , Min:90 , Max:153        Diastolic (24hrs), Av, Min:54, Max:84      Pulse Pulse  Av  Min: 72  Max: 88   Respirations Resp  Av.4  Min: 12  Max: 30   Pulse oximetry SpO2  Av.7 %  Min: 90 %  Max: 97 %       Sling in place  Dressing CDI  Sensation intact in upper arm over biceps as well as in hand r/m/u nerve distribution  Able to abduct and oppose left thumb  +Radial pulse  +cap refill     Labs:  Recent Labs   Lab Test  10/24/18   1613   POTASSIUM  4.2     Recent Labs   Lab Test  10/26/18   0621  10/24/18   1613   HGB  11.7  15.2     No results for input(s): INR in the last 75810 hours.  Recent Labs   Lab Test  10/24/18   1613   PLT  347       A/P  1. Plan   Continue ASA for DVT prophylaxis.     Mobilize with PT/OT    Non-WB left UE.     Continue current pain regiment.   Leave dressing intact    2. Disposition   Anticipate d/c to home today    Shanti Moran PA-C

## 2018-10-26 NOTE — PROGRESS NOTES
Patient c/o diaphoresis and concerned about hypoglycemia. Blood sugar 149. Encouraged to do IS more. Assisted with hygiene. Patient feels better

## 2018-10-26 NOTE — PLAN OF CARE
"Problem: Patient Care Overview  Goal: Plan of Care/Patient Progress Review  Outcome: Improving  Pt received discharge instructions and received medication review with all the discharge meds.  Pt and pt's brother anxious to get to airport.  Pt reviewed instructions and medications and stated \"I feel fine to go home.  We are already looking for a doctor for follow up. Thank you for your care.\"  Pt has met her goals.       "

## 2018-10-26 NOTE — PLAN OF CARE
Problem: Patient Care Overview  Goal: Plan of Care/Patient Progress Review  Outcome: Improving  Up with 1/belt to bathroom. Voiding well. Po fair. Has painful neck so sling somewhat uncomfortable to neck. IV saline locked. Hair washed. Will continue to monitor.

## 2018-10-26 NOTE — PROGRESS NOTES
[facility]    Hospitalist Progress Note    Assessment & Plan   Moon Mg is a 48 year old female with a history of DM type II and hypothyroidism who was admitted on 10/24/2018 for a left humerus fracture.  I was asked to see the patient for medical co-management and pre-op evaluation      Left humerus fracture  Fell against a door frame fracturing her left humerus.  Imaging showed an oblique displaced fracture through the shaft of the humerus.  Patient was admitted by orthopedic surgery for further pain control and possible surgical intervention   - Will defer pain control and briseyda-operative management to orthopedic surgery if they decide to operate   -  moderate risk for a moderate risk surgery if they decide to operate.  Patient is morbidly obese so it is difficulty to assess her metabolic activity but assume it is >4.  She does get short of breath with significant exertion but no chest pain.  Will obtain EKG in mean time    -taken to OR 10/25.   S/p ORIF L proximal humerus shaft   Pain control per ortho  Doing well post op    DM type II   Patient isn't too forth coming with what her home sugars have been recently but does note they were high a couple weeks ago because she was on Prednisone but is no longer on that.  No HgbA1c on file.  PTA on Lantus 80 U at bedtime and SSI   - HgbA1c 7.3%  - decreased Lantus to 60 U and adjust as needed - had lantus 60 units X 1 2300 last night.   -will give 45 units last night with  range this am.  Doing well.   -dm diet  - instructions to patient at discharge:   Go up slowly on your lantus:  Check blood sugars 4 times daily for next 3-4 days then as you normally would if back to your baseline appetite and food intake.   If blood sugars in the 100 range today then stay at Lantus 45 units tonight, If blood sugars in 200 range than increase to 55 units at bedtime. If blood sugars in the 300 range increase to ~65 units tonight at bedtime. If blood  sugars greater than 400 then increase back to 75-80 units of lantus tonight. You can use this scale for then next several days.  Stay hydrated with water to prevent dehydration if blood sugars are elevated.   Discussed with patient in detail and included above in discharge orders.        Hypothyroidism   No TSH on file  - PTA Synthroid 175 mcg      Morbid obesity  - Patient to follow up with PCP   -hx of snoring but denies apneas or daytime somnolence  -frequent sats/continuous oximetry as ordered  - freq vitals as ordered post o   - aggressive IS, mobilize     DVT Prophylaxis: Defer to primary service    Code Status: Full code     Disposition: likely discharge     Jorge Paul MD  Text Page  (7am to 6pm)  Interval History   Feels well this am  Pain about 4/10 and manageable  No cp,sob, n/v/d/abd pain   this am  No new issues  Flight at 3pm today  Meeting with OT      -Data reviewed today: I reviewed all new labs and imaging results over the last 24 hours. I personally reviewed imaging and labs since admission      Physical Exam   Temp: 98  F (36.7  C) Temp src: Oral BP: 110/64 Pulse: 72 Heart Rate: 86 Resp: 16 SpO2: 90 % O2 Device: Nasal cannula Oxygen Delivery: 2 LPM  Vitals:    10/24/18 1410   Weight: 138.3 kg (305 lb)     Vital Signs with Ranges  Temp:  [97.5  F (36.4  C)-99.2  F (37.3  C)] 98  F (36.7  C)  Pulse:  [72-88] 72  Heart Rate:  [68-90] 86  Resp:  [12-30] 16  BP: ()/(54-84) 110/64  SpO2:  [90 %-97 %] 90 %  I/O last 3 completed shifts:  In: 2087 [I.V.:2087]  Out: 650 [Urine:50; Blood:600]    Constitutional: Alert, appears well, sitting on edge of bed, talking with OT, nad, nontoxic  Respiratory: Breathing easily, clear anteriorly  Cardiovascular: RRR not tachy. No murmur  GI: soft, NT  Skin/Integumen: no rash or edema  MSK: LUE in sling, incision bandaged  Neuro/Psych: nl affect, nl mentation and speech. Alert and fully oriented.      Medications       acetaminophen  975 mg Oral Q8H      aspirin  325 mg Oral Daily     insulin aspart  1-7 Units Subcutaneous TID AC     insulin aspart  1-5 Units Subcutaneous At Bedtime     insulin glargine  45 Units Subcutaneous At Bedtime     ketorolac  30 mg Intravenous Q6H     levothyroxine  175 mcg Oral Daily     senna-docusate  1 tablet Oral BID    Or     senna-docusate  2 tablet Oral BID     sodium chloride (PF)  3 mL Intracatheter Q8H       Data     Recent Labs  Lab 10/26/18  0621 10/24/18  1613   WBC  --  14.8*   HGB 11.7 15.2   MCV  --  85   PLT  --  347   NA  --  141   POTASSIUM  --  4.2   CHLORIDE  --  107   CO2  --  26   BUN  --  17   CR  --  0.69   ANIONGAP  --  8   ANDREINA  --  9.4   GLC  --  154*       Imaging:   Recent Results (from the past 24 hour(s))   XR Surgery TREE L/T 5 Min Fluoro w Stills    Narrative    SURGERY C-ARM FLUOROSCOPY LESS THAN FIVE MINUTES WITH STILLS  10/25/2018 2:53 PM     COMPARISON: 10/24/2018    HISTORY: Left humerus C-arm 4, fluoro time 0.6 minutes.     NUMBER OF IMAGES ACQUIRED: 6    VIEWS: AP, lateral, and oblique    FLUOROSCOPY TIME: 0.3      Impression    IMPRESSION: Intraoperative images obtained during open reduction,  internal fixation of a left humerus fracture.    CORNELIUS MASCORRO MD

## 2018-10-26 NOTE — PROVIDER NOTIFICATION
Talked with Shanti VASQUEZ and notified her that pt is anxious to get going.  Pt has flight back to home at 3pm.   Web based paged Hosptialist to notify them of pt's flight time for discharge.  Working with OT currently to reschedule appointment earlier so pt is able to go.  Will continue to monitor.

## 2018-10-26 NOTE — PLAN OF CARE
Problem: Patient Care Overview  Goal: Plan of Care/Patient Progress Review  OT: Evaluation and treatment initiated. Pt admitted under Observation status. Pt lives with her mother in a two story home. Prior pt independent in all I/ADLs.   Discharge Planner OT   Patient plan for discharge: Eager to discharge today, to catch a flight   Current status: Pt went completed functional transfers independently (chair,toilet). Pt able to complete don/doff sling with minimum assist. Pt completed don/doff socks with minimum assist and barbara slip on shoes with SBA, pt reported plans to have assist from mother as needed. Educated on compensatory techniques for I/ADL tasks. Educated on Codman s Pendulum exercises and pt completed pendulum exercises for 1 set of 8 reps. Pt completed 5 LUE exercises for 1 set of 8-10 reps each exercise. No further IP OT warranted, pt to follow up with OP OT or PT for LUE strengthening.   Barriers to return to prior living situation: pain  Recommendations for discharge: Home with assist as needed for I/ADLs (dressing, bathing, driving) and OP OT/PT   Rationale for recommendations: Pt will have mother to assist in I/ADL tasks (driving, dressing, household tasks). OP OT or PT for LUE strengthening and to increase independence in I/ADLs.     Occupational Therapy Discharge Summary    Reason for therapy discharge:    No further IP OT warranted    Progress towards therapy goal(s). See goals on Care Plan in [facility] health record for goal details.  Goals partially met.  Barriers to achieving goals:   discharge from facility.    Therapy recommendation(s):    Continued therapy is recommended.  Rationale/Recommendations:  OP OT or PT for LUE strengthening and to increase independence in I/ADLs. .  Continue home exercise program. Family to assist in I/ADL tasks as needed (driving, dressing, bathing, household chores)            Entered by: Bradley Fraga 10/26/2018 10:17 AM

## 2018-10-26 NOTE — OP NOTE
Procedure Date: 10/25/2018      PREOPERATIVE DIAGNOSIS:  Left proximal humerus shaft fracture.      POSTOPERATIVE DIAGNOSIS:  Left proximal humerus shaft fracture.      PROCEDURE:  Open reduction internal fixation of left proximal humeral shaft fracture.      SURGEON:  Pietro Boone MD.      ASSISTANT:  PEDRO Fowler.  A skilled first assistant was necessary for patient positioning, prepping and draping, help with reduction as well as closing.      ESTIMATED BLOOD LOSS:  600 mL.      ANESTHESIA:  General.      COMPLICATIONS:  None apparent.      IMPLANTS:  Synthes large frag 7-hole LCDC plate.      INDICATIONS:  The patient is a 48-year-old female who is visiting here from [place] on a work trip when she had a trip and fall yesterday and landed into the door, onto her left shoulder.  She had immediate pain and inability to use her left upper extremity.  She was brought to the Emergency Department and found to have a displaced humeral shaft fracture proximally, not involving the joint.  It was significantly displaced and angulated and after discussion about treatment options with the patient, including nonoperative measures with a sling, gravity traction and followup in [place] versus open reduction internal fixation, she decided that she would like to proceed with open reduction internal fixation for ability to travel, help with pain control as well as help with her reduction.  That was reasonable.  She got admitted for observation and we proceeded with the surgery.      DESCRIPTION OF THE PROCEDURE:  On the date of the procedure, the patient was met in the preoperative area by the surgeon and anesthesia team.  Her left humerus was marked by the operative surgeon and informed consent was obtained.  Risks and benefits of the surgery were discussed with the patient including risk of bleeding, infection, damage to neurovascular structures, risk of nonunion, malunion, screw cutout, risk of need for future surgery  for revision as well as risk of anesthesia.  She agreed to proceed.      She was then brought to the operating room and general anesthesia was administered.  She was placed on the operating room table in a supine position.  She was well strapped to the table and her left shoulder was placed laterally to the table to allow for excellent exposure to the left upper extremity.  The left upper extremity was then prepped and draped in the usual sterile fashion.  Preoperative antibiotics were given and preoperative timeout was performed.  Began the procedure by making a standard incision for an anterolateral exposure for the humerus and midshaft humerus.  Sharp dissection was carried down through the skin as well as the subcutaneous tissue.  She had a significant amount of subcutaneous tissue that was quite deep.  We, therefore, obtained adequate hemostasis.  We finally got down to the fascia of the deltoid as well as the biceps.  The biceps was retracted medially.  The anterior fibers of the deltoid were released off the distal fragment to allow for excellent exposure of the fracture.  The fracture was suctioned out and curetted and the ends of the fracture were cleared off using sharp dissection both proximally and distally.  Next, when we had adequate exposure of the fracture, we reduced the fracture using a combination of clamps and K-wires; we had an anatomic reduction.  Then we attempted to place a lag screw; it was a 3.5 lag screw that was placed from lateral to medial across the fracture site.  Next, a 7-hole plate was placed; it was double checked under fluoroscopy and we were happy with the position.  Three distal and 3 proximal, 4.5 cortical screws were placed bicortically.  Afterwards we double checked the AP as well as laterals on the fluoroscopy; unfortunately it looked like the fracture displaced after we placed the plate on there and it looks like the lag screw actually pushed the distal fragment away.   Therefore, the plate was removed.  Lag screw was removed.  The fracture was re-reduced.  The lag screw was repositioned and double checked under fluoroscopy to make sure that it was well reduced.  The plate was then put into the appropriate position and held together with clamps across the fracture site and screws were repositioned into the plate appropriately.  X-rays afterwards were taken that demonstrated excellent reduction of the fracture site without any loss of reduction this time.  Range of motion was tested and she had full range of motion and the fracture was nice and stable.  We then proceeded with copious irrigation of the wound.  We closed the deltoid back down to the shaft using 0 Vicryl, closed the interval using 0 Vicryl and then closed the deep dermals with 2-0 Vicryl and staples for the skin.  A sterile dressing was applied.  The patient was placed into a sling.  She was awakened from anesthesia and brought to the PACU for recovery.         JOSE ALBERTO GARBER MD             D: 10/25/2018   T: 10/25/2018   MT: ELVIN      Name:     YEN CHEN   MRN:      [id number]        Account:        [id number]   :      1970           Procedure Date: 10/25/2018      Document: [id number]

## 2018-10-30 LAB — INTERPRETATION ECG - MUSE: NORMAL

## 2018-11-14 ENCOUNTER — HEALTH MAINTENANCE LETTER (OUTPATIENT)
Age: 48
End: 2018-11-14

## 2020-03-11 ENCOUNTER — HEALTH MAINTENANCE LETTER (OUTPATIENT)
Age: 50
End: 2020-03-11

## 2021-01-03 ENCOUNTER — HEALTH MAINTENANCE LETTER (OUTPATIENT)
Age: 51
End: 2021-01-03

## 2021-03-07 ENCOUNTER — HEALTH MAINTENANCE LETTER (OUTPATIENT)
Age: 51
End: 2021-03-07

## 2021-04-25 ENCOUNTER — HEALTH MAINTENANCE LETTER (OUTPATIENT)
Age: 51
End: 2021-04-25

## 2021-10-10 ENCOUNTER — HEALTH MAINTENANCE LETTER (OUTPATIENT)
Age: 51
End: 2021-10-10

## 2022-03-26 ENCOUNTER — HEALTH MAINTENANCE LETTER (OUTPATIENT)
Age: 52
End: 2022-03-26

## 2022-05-21 ENCOUNTER — HEALTH MAINTENANCE LETTER (OUTPATIENT)
Age: 52
End: 2022-05-21

## 2022-09-18 ENCOUNTER — HEALTH MAINTENANCE LETTER (OUTPATIENT)
Age: 52
End: 2022-09-18

## 2023-05-07 ENCOUNTER — HEALTH MAINTENANCE LETTER (OUTPATIENT)
Age: 53
End: 2023-05-07

## 2023-06-04 ENCOUNTER — HEALTH MAINTENANCE LETTER (OUTPATIENT)
Age: 53
End: 2023-06-04

## (undated) DEVICE — SU VICRYL 0 CP-1 27" J467H

## (undated) DEVICE — DRSG KERLIX FLUFFS X5

## (undated) DEVICE — DRILL BIT SYN 4.3X180MM  310.431

## (undated) DEVICE — SOL NACL 0.9% IRRIG 1000ML BOTTLE 07138-09

## (undated) DEVICE — STPL SKIN 35W 059037

## (undated) DEVICE — DRAPE C-ARM 60X42" 1013

## (undated) DEVICE — PACK SET-UP STD 9102

## (undated) DEVICE — DRILL BIT SYN QUICK COUPLING 4.5X145MM  310.44

## (undated) DEVICE — DRILL BIT QUICK COUPLING 2.5X110MM GOLD 310.25

## (undated) DEVICE — SPONGE SURGIFOAM 50

## (undated) DEVICE — DRAPE STERI U 1015

## (undated) DEVICE — IMP SCR SYN CORTEX 4.5X26MM SELF TAP SS 214.826: Type: IMPLANTABLE DEVICE | Site: HUMERUS | Status: NON-FUNCTIONAL

## (undated) DEVICE — SOL WATER IRRIG 1000ML BOTTLE 2F7114

## (undated) DEVICE — PREP CHLORAPREP 26ML TINTED ORANGE  260815

## (undated) DEVICE — DRILL BIT 3.2X145MM  310.31

## (undated) DEVICE — LINEN TOWEL PACK X5 5464

## (undated) DEVICE — Device

## (undated) DEVICE — MANIFOLD NEPTUNE 4 PORT 700-20

## (undated) DEVICE — ESU GROUND PAD UNIVERSAL W/O CORD

## (undated) DEVICE — GLOVE PROTEXIS W/NEU-THERA 8.5  2D73TE85

## (undated) DEVICE — SPONGE LAP 18X18" X8435

## (undated) DEVICE — SU VICRYL 2-0 CP-1 27" UND J266H

## (undated) DEVICE — GLOVE PROTEXIS POWDER FREE 8.5 ORTHOPEDIC 2D73ET85

## (undated) DEVICE — NDL 22GA 1.5"

## (undated) DEVICE — DRAPE SHEET REV FOLD 3/4 9349

## (undated) DEVICE — PACK OPEN SHOULDER SOP15OCFSC

## (undated) DEVICE — PREP SKIN SCRUB TRAY 4461A

## (undated) DEVICE — DRSG AQUACEL AG 3.5X9.75" HYDROFIBER 412011

## (undated) RX ORDER — GLYCOPYRROLATE 0.2 MG/ML
INJECTION, SOLUTION INTRAMUSCULAR; INTRAVENOUS
Status: DISPENSED
Start: 2018-10-25

## (undated) RX ORDER — DEXAMETHASONE SODIUM PHOSPHATE 4 MG/ML
INJECTION, SOLUTION INTRA-ARTICULAR; INTRALESIONAL; INTRAMUSCULAR; INTRAVENOUS; SOFT TISSUE
Status: DISPENSED
Start: 2018-10-25

## (undated) RX ORDER — BUPIVACAINE HYDROCHLORIDE AND EPINEPHRINE 5; 5 MG/ML; UG/ML
INJECTION, SOLUTION EPIDURAL; INTRACAUDAL; PERINEURAL
Status: DISPENSED
Start: 2018-10-25

## (undated) RX ORDER — FENTANYL CITRATE 50 UG/ML
INJECTION, SOLUTION INTRAMUSCULAR; INTRAVENOUS
Status: DISPENSED
Start: 2018-10-25

## (undated) RX ORDER — PROPOFOL 10 MG/ML
INJECTION, EMULSION INTRAVENOUS
Status: DISPENSED
Start: 2018-10-25

## (undated) RX ORDER — HYDROMORPHONE HYDROCHLORIDE 1 MG/ML
INJECTION, SOLUTION INTRAMUSCULAR; INTRAVENOUS; SUBCUTANEOUS
Status: DISPENSED
Start: 2018-10-25

## (undated) RX ORDER — LIDOCAINE HYDROCHLORIDE 20 MG/ML
INJECTION, SOLUTION EPIDURAL; INFILTRATION; INTRACAUDAL; PERINEURAL
Status: DISPENSED
Start: 2018-10-25

## (undated) RX ORDER — ONDANSETRON 2 MG/ML
INJECTION INTRAMUSCULAR; INTRAVENOUS
Status: DISPENSED
Start: 2018-10-25

## (undated) RX ORDER — NEOSTIGMINE METHYLSULFATE 1 MG/ML
VIAL (ML) INJECTION
Status: DISPENSED
Start: 2018-10-25